# Patient Record
Sex: MALE | Race: WHITE | NOT HISPANIC OR LATINO | Employment: OTHER | URBAN - METROPOLITAN AREA
[De-identification: names, ages, dates, MRNs, and addresses within clinical notes are randomized per-mention and may not be internally consistent; named-entity substitution may affect disease eponyms.]

---

## 2017-11-01 ENCOUNTER — HOSPITAL ENCOUNTER (INPATIENT)
Facility: HOSPITAL | Age: 52
LOS: 1 days | Discharge: HOME | End: 2017-11-03
Attending: EMERGENCY MEDICINE | Admitting: INTERNAL MEDICINE

## 2017-11-01 ENCOUNTER — APPOINTMENT (OUTPATIENT)
Dept: GENERAL RADIOLOGY | Facility: HOSPITAL | Age: 52
End: 2017-11-01

## 2017-11-01 DIAGNOSIS — J18.9 PNEUMONIA OF RIGHT LOWER LOBE DUE TO INFECTIOUS ORGANISM: Primary | ICD-10-CM

## 2017-11-01 DIAGNOSIS — R09.02 HYPOXIA: ICD-10-CM

## 2017-11-01 PROBLEM — H40.9 GLAUCOMA: Status: ACTIVE | Noted: 2017-11-01

## 2017-11-01 PROBLEM — J13 PNEUMONIA DUE TO STREPTOCOCCUS PNEUMONIAE: Status: RESOLVED | Noted: 2017-11-01 | Resolved: 2017-11-01

## 2017-11-01 PROBLEM — J13 PNEUMONIA OF LEFT LOWER LOBE DUE TO STREPTOCOCCUS PNEUMONIAE: Status: ACTIVE | Noted: 2017-11-01

## 2017-11-01 PROBLEM — E78.00 PURE HYPERCHOLESTEROLEMIA: Status: ACTIVE | Noted: 2017-11-01

## 2017-11-01 PROBLEM — G40.909 SEIZURE DISORDER: Status: ACTIVE | Noted: 2017-11-01

## 2017-11-01 PROBLEM — J13 PNEUMONIA DUE TO STREPTOCOCCUS PNEUMONIAE: Status: ACTIVE | Noted: 2017-11-01

## 2017-11-01 PROBLEM — E78.01 FAMILIAL HYPERCHOLESTEROLEMIA: Status: ACTIVE | Noted: 2017-11-01

## 2017-11-01 PROBLEM — N32.81 OAB (OVERACTIVE BLADDER): Status: ACTIVE | Noted: 2017-11-01

## 2017-11-01 PROBLEM — F41.1 GAD (GENERALIZED ANXIETY DISORDER): Status: ACTIVE | Noted: 2017-11-01

## 2017-11-01 LAB
ALBUMIN SERPL-MCNC: 4.2 G/DL (ref 3.2–4.8)
ALBUMIN/GLOB SERPL: 1.4 G/DL (ref 1.5–2.5)
ALP SERPL-CCNC: 83 U/L (ref 25–100)
ALT SERPL W P-5'-P-CCNC: 25 U/L (ref 7–40)
ANION GAP SERPL CALCULATED.3IONS-SCNC: 5 MMOL/L (ref 3–11)
AST SERPL-CCNC: 25 U/L (ref 0–33)
BASOPHILS # BLD AUTO: 0.03 10*3/MM3 (ref 0–0.2)
BASOPHILS NFR BLD AUTO: 0.3 % (ref 0–1)
BILIRUB SERPL-MCNC: 0.5 MG/DL (ref 0.3–1.2)
BNP SERPL-MCNC: <2 PG/ML (ref 0–100)
BUN BLD-MCNC: 6 MG/DL (ref 9–23)
BUN/CREAT SERPL: 12 (ref 7–25)
CALCIUM SPEC-SCNC: 9.1 MG/DL (ref 8.7–10.4)
CHLORIDE SERPL-SCNC: 97 MMOL/L (ref 99–109)
CO2 SERPL-SCNC: 29 MMOL/L (ref 20–31)
CREAT BLD-MCNC: 0.5 MG/DL (ref 0.6–1.3)
D-LACTATE SERPL-SCNC: 1.2 MMOL/L (ref 0.5–2)
DEPRECATED RDW RBC AUTO: 41.8 FL (ref 37–54)
EOSINOPHIL # BLD AUTO: 0.17 10*3/MM3 (ref 0–0.3)
EOSINOPHIL NFR BLD AUTO: 1.9 % (ref 0–3)
ERYTHROCYTE [DISTWIDTH] IN BLOOD BY AUTOMATED COUNT: 13 % (ref 11.3–14.5)
GFR SERPL CREATININE-BSD FRML MDRD: >150 ML/MIN/1.73
GLOBULIN UR ELPH-MCNC: 3.1 GM/DL
GLUCOSE BLD-MCNC: 93 MG/DL (ref 70–100)
HCT VFR BLD AUTO: 46.5 % (ref 38.9–50.9)
HGB BLD-MCNC: 16 G/DL (ref 13.1–17.5)
HOLD SPECIMEN: NORMAL
HOLD SPECIMEN: NORMAL
IMM GRANULOCYTES # BLD: 0.02 10*3/MM3 (ref 0–0.03)
IMM GRANULOCYTES NFR BLD: 0.2 % (ref 0–0.6)
LYMPHOCYTES # BLD AUTO: 2.12 10*3/MM3 (ref 0.6–4.8)
LYMPHOCYTES NFR BLD AUTO: 23.5 % (ref 24–44)
MCH RBC QN AUTO: 30.4 PG (ref 27–31)
MCHC RBC AUTO-ENTMCNC: 34.4 G/DL (ref 32–36)
MCV RBC AUTO: 88.2 FL (ref 80–99)
MONOCYTES # BLD AUTO: 1.07 10*3/MM3 (ref 0–1)
MONOCYTES NFR BLD AUTO: 11.8 % (ref 0–12)
NEUTROPHILS # BLD AUTO: 5.62 10*3/MM3 (ref 1.5–8.3)
NEUTROPHILS NFR BLD AUTO: 62.3 % (ref 41–71)
PLATELET # BLD AUTO: 254 10*3/MM3 (ref 150–450)
PMV BLD AUTO: 10.2 FL (ref 6–12)
POTASSIUM BLD-SCNC: 3.9 MMOL/L (ref 3.5–5.5)
PROCALCITONIN SERPL-MCNC: 0.18 NG/ML
PROT SERPL-MCNC: 7.3 G/DL (ref 5.7–8.2)
RBC # BLD AUTO: 5.27 10*6/MM3 (ref 4.2–5.76)
SODIUM BLD-SCNC: 131 MMOL/L (ref 132–146)
TROPONIN I SERPL-MCNC: 0 NG/ML (ref 0–0.07)
TROPONIN I SERPL-MCNC: 0 NG/ML (ref 0–0.07)
WBC NRBC COR # BLD: 9.03 10*3/MM3 (ref 3.5–10.8)
WHOLE BLOOD HOLD SPECIMEN: NORMAL
WHOLE BLOOD HOLD SPECIMEN: NORMAL

## 2017-11-01 PROCEDURE — A9270 NON-COVERED ITEM OR SERVICE: HCPCS | Performed by: FAMILY MEDICINE

## 2017-11-01 PROCEDURE — 99285 EMERGENCY DEPT VISIT HI MDM: CPT

## 2017-11-01 PROCEDURE — 87449 NOS EACH ORGANISM AG IA: CPT | Performed by: FAMILY MEDICINE

## 2017-11-01 PROCEDURE — 63710000001 BUDESONIDE-FORMOTEROL 160-4.5 MCG/ACT AEROSOL 6 G INHALER: Performed by: FAMILY MEDICINE

## 2017-11-01 PROCEDURE — 84484 ASSAY OF TROPONIN QUANT: CPT

## 2017-11-01 PROCEDURE — 99220 PR INITIAL OBSERVATION CARE/DAY 70 MINUTES: CPT | Performed by: FAMILY MEDICINE

## 2017-11-01 PROCEDURE — 63710000001 CARBAMAZEPINE 200 MG TABLET: Performed by: FAMILY MEDICINE

## 2017-11-01 PROCEDURE — 63710000001 ATORVASTATIN 20 MG TABLET: Performed by: FAMILY MEDICINE

## 2017-11-01 PROCEDURE — 87040 BLOOD CULTURE FOR BACTERIA: CPT | Performed by: EMERGENCY MEDICINE

## 2017-11-01 PROCEDURE — 93005 ELECTROCARDIOGRAM TRACING: CPT

## 2017-11-01 PROCEDURE — 93005 ELECTROCARDIOGRAM TRACING: CPT | Performed by: EMERGENCY MEDICINE

## 2017-11-01 PROCEDURE — 94799 UNLISTED PULMONARY SVC/PX: CPT

## 2017-11-01 PROCEDURE — 63710000001 SENNA 8.6 MG TABLET: Performed by: FAMILY MEDICINE

## 2017-11-01 PROCEDURE — 83880 ASSAY OF NATRIURETIC PEPTIDE: CPT | Performed by: EMERGENCY MEDICINE

## 2017-11-01 PROCEDURE — 84145 PROCALCITONIN (PCT): CPT | Performed by: EMERGENCY MEDICINE

## 2017-11-01 PROCEDURE — 63710000001 DOCUSATE SODIUM 100 MG CAPSULE: Performed by: FAMILY MEDICINE

## 2017-11-01 PROCEDURE — 63710000001 TIMOLOL 0.5 % SOLUTION 5 ML BOTTLE: Performed by: FAMILY MEDICINE

## 2017-11-01 PROCEDURE — 36415 COLL VENOUS BLD VENIPUNCTURE: CPT

## 2017-11-01 PROCEDURE — 63710000001 TRAZODONE 50 MG TABLET: Performed by: FAMILY MEDICINE

## 2017-11-01 PROCEDURE — 25010000002 AZITHROMYCIN: Performed by: EMERGENCY MEDICINE

## 2017-11-01 PROCEDURE — 80053 COMPREHEN METABOLIC PANEL: CPT | Performed by: EMERGENCY MEDICINE

## 2017-11-01 PROCEDURE — 63710000001 ACETAMINOPHEN 325 MG TABLET: Performed by: FAMILY MEDICINE

## 2017-11-01 PROCEDURE — 25010000002 CEFTRIAXONE PER 250 MG: Performed by: EMERGENCY MEDICINE

## 2017-11-01 PROCEDURE — 94760 N-INVAS EAR/PLS OXIMETRY 1: CPT

## 2017-11-01 PROCEDURE — 87899 AGENT NOS ASSAY W/OPTIC: CPT | Performed by: FAMILY MEDICINE

## 2017-11-01 PROCEDURE — 85025 COMPLETE CBC W/AUTO DIFF WBC: CPT | Performed by: EMERGENCY MEDICINE

## 2017-11-01 PROCEDURE — 83605 ASSAY OF LACTIC ACID: CPT | Performed by: EMERGENCY MEDICINE

## 2017-11-01 PROCEDURE — 63710000001 OXYBUTYNIN 5 MG TABLET: Performed by: FAMILY MEDICINE

## 2017-11-01 PROCEDURE — 71010 HC CHEST PA OR AP: CPT

## 2017-11-01 RX ORDER — DOCUSATE SODIUM 100 MG/1
100 CAPSULE, LIQUID FILLED ORAL 2 TIMES DAILY
COMMUNITY

## 2017-11-01 RX ORDER — MULTIVITAMIN WITH FOLIC ACID 400 MCG
1 TABLET ORAL EVERY MORNING
COMMUNITY

## 2017-11-01 RX ORDER — TIMOLOL MALEATE 5 MG/ML
1 SOLUTION/ DROPS OPHTHALMIC EVERY 12 HOURS SCHEDULED
Status: DISCONTINUED | OUTPATIENT
Start: 2017-11-01 | End: 2017-11-03 | Stop reason: HOSPADM

## 2017-11-01 RX ORDER — SODIUM CHLORIDE 0.9 % (FLUSH) 0.9 %
10 SYRINGE (ML) INJECTION AS NEEDED
Status: DISCONTINUED | OUTPATIENT
Start: 2017-11-01 | End: 2017-11-03 | Stop reason: HOSPADM

## 2017-11-01 RX ORDER — CEFTRIAXONE SODIUM 1 G/50ML
1 INJECTION, SOLUTION INTRAVENOUS EVERY 24 HOURS
Status: DISCONTINUED | OUTPATIENT
Start: 2017-11-02 | End: 2017-11-02

## 2017-11-01 RX ORDER — CEFTRIAXONE SODIUM 1 G/50ML
1 INJECTION, SOLUTION INTRAVENOUS ONCE
Status: COMPLETED | OUTPATIENT
Start: 2017-11-01 | End: 2017-11-01

## 2017-11-01 RX ORDER — CARBAMAZEPINE 200 MG/1
200 TABLET ORAL 3 TIMES DAILY
COMMUNITY

## 2017-11-01 RX ORDER — SENNOSIDES 8.6 MG
2 TABLET ORAL 2 TIMES DAILY
COMMUNITY

## 2017-11-01 RX ORDER — SENNOSIDES 8.6 MG
2 TABLET ORAL 2 TIMES DAILY
Status: DISCONTINUED | OUTPATIENT
Start: 2017-11-01 | End: 2017-11-03 | Stop reason: HOSPADM

## 2017-11-01 RX ORDER — DOCUSATE SODIUM 100 MG/1
100 CAPSULE, LIQUID FILLED ORAL 2 TIMES DAILY
Status: DISCONTINUED | OUTPATIENT
Start: 2017-11-01 | End: 2017-11-03 | Stop reason: HOSPADM

## 2017-11-01 RX ORDER — DIPHENOXYLATE HYDROCHLORIDE AND ATROPINE SULFATE 2.5; .025 MG/1; MG/1
1 TABLET ORAL EVERY MORNING
Status: DISCONTINUED | OUTPATIENT
Start: 2017-11-02 | End: 2017-11-03 | Stop reason: HOSPADM

## 2017-11-01 RX ORDER — TRAZODONE HYDROCHLORIDE 50 MG/1
50 TABLET ORAL NIGHTLY
Status: DISCONTINUED | OUTPATIENT
Start: 2017-11-01 | End: 2017-11-03 | Stop reason: HOSPADM

## 2017-11-01 RX ORDER — IPRATROPIUM BROMIDE AND ALBUTEROL SULFATE 2.5; .5 MG/3ML; MG/3ML
3 SOLUTION RESPIRATORY (INHALATION)
Status: DISCONTINUED | OUTPATIENT
Start: 2017-11-01 | End: 2017-11-03 | Stop reason: HOSPADM

## 2017-11-01 RX ORDER — LATANOPROST 50 UG/ML
1 SOLUTION/ DROPS OPHTHALMIC NIGHTLY
Status: DISCONTINUED | OUTPATIENT
Start: 2017-11-02 | End: 2017-11-03 | Stop reason: HOSPADM

## 2017-11-01 RX ORDER — TRAVOPROST OPHTHALMIC SOLUTION 0.04 MG/ML
1 SOLUTION OPHTHALMIC NIGHTLY
COMMUNITY

## 2017-11-01 RX ORDER — ONDANSETRON 2 MG/ML
4 INJECTION INTRAMUSCULAR; INTRAVENOUS EVERY 6 HOURS PRN
Status: DISCONTINUED | OUTPATIENT
Start: 2017-11-01 | End: 2017-11-03 | Stop reason: HOSPADM

## 2017-11-01 RX ORDER — SODIUM CHLORIDE 9 MG/ML
75 INJECTION, SOLUTION INTRAVENOUS CONTINUOUS
Status: DISCONTINUED | OUTPATIENT
Start: 2017-11-01 | End: 2017-11-03 | Stop reason: HOSPADM

## 2017-11-01 RX ORDER — ACETAMINOPHEN 500 MG
1000 TABLET ORAL ONCE
Status: COMPLETED | OUTPATIENT
Start: 2017-11-01 | End: 2017-11-01

## 2017-11-01 RX ORDER — BUDESONIDE AND FORMOTEROL FUMARATE DIHYDRATE 160; 4.5 UG/1; UG/1
2 AEROSOL RESPIRATORY (INHALATION)
Status: DISCONTINUED | OUTPATIENT
Start: 2017-11-01 | End: 2017-11-03 | Stop reason: HOSPADM

## 2017-11-01 RX ORDER — CARBAMAZEPINE 200 MG/1
200 TABLET ORAL 3 TIMES DAILY
Status: DISCONTINUED | OUTPATIENT
Start: 2017-11-01 | End: 2017-11-03 | Stop reason: HOSPADM

## 2017-11-01 RX ORDER — OXYBUTYNIN CHLORIDE 5 MG/1
5 TABLET ORAL 3 TIMES DAILY
Status: DISCONTINUED | OUTPATIENT
Start: 2017-11-01 | End: 2017-11-03 | Stop reason: HOSPADM

## 2017-11-01 RX ORDER — TRAZODONE HYDROCHLORIDE 50 MG/1
50 TABLET ORAL NIGHTLY
COMMUNITY

## 2017-11-01 RX ORDER — HYDROCODONE BITARTRATE AND ACETAMINOPHEN 5; 325 MG/1; MG/1
1 TABLET ORAL EVERY 4 HOURS PRN
Status: DISCONTINUED | OUTPATIENT
Start: 2017-11-01 | End: 2017-11-03 | Stop reason: HOSPADM

## 2017-11-01 RX ORDER — ACETAMINOPHEN 325 MG/1
650 TABLET ORAL 2 TIMES DAILY
Status: DISCONTINUED | OUTPATIENT
Start: 2017-11-01 | End: 2017-11-03 | Stop reason: HOSPADM

## 2017-11-01 RX ORDER — SIMVASTATIN 40 MG
40 TABLET ORAL NIGHTLY
COMMUNITY

## 2017-11-01 RX ORDER — ATORVASTATIN CALCIUM 20 MG/1
20 TABLET, FILM COATED ORAL DAILY
Status: DISCONTINUED | OUTPATIENT
Start: 2017-11-01 | End: 2017-11-03 | Stop reason: HOSPADM

## 2017-11-01 RX ORDER — OXYBUTYNIN CHLORIDE 5 MG/1
5 TABLET ORAL 3 TIMES DAILY
COMMUNITY

## 2017-11-01 RX ORDER — BRIMONIDINE TARTRATE AND TIMOLOL MALEATE 2; 5 MG/ML; MG/ML
1 SOLUTION OPHTHALMIC EVERY 12 HOURS
COMMUNITY

## 2017-11-01 RX ORDER — SODIUM CHLORIDE 0.9 % (FLUSH) 0.9 %
1-10 SYRINGE (ML) INJECTION AS NEEDED
Status: DISCONTINUED | OUTPATIENT
Start: 2017-11-01 | End: 2017-11-03 | Stop reason: HOSPADM

## 2017-11-01 RX ORDER — LORAZEPAM 2 MG/ML
0.5 INJECTION INTRAMUSCULAR EVERY 6 HOURS PRN
Status: DISCONTINUED | OUTPATIENT
Start: 2017-11-01 | End: 2017-11-03 | Stop reason: HOSPADM

## 2017-11-01 RX ORDER — ACETAMINOPHEN 325 MG/1
650 TABLET ORAL 2 TIMES DAILY
COMMUNITY

## 2017-11-01 RX ADMIN — CEFTRIAXONE SODIUM 1 G: 1 INJECTION, SOLUTION INTRAVENOUS at 17:05

## 2017-11-01 RX ADMIN — TIMOLOL MALEATE 1 DROP: 5 SOLUTION/ DROPS OPHTHALMIC at 21:41

## 2017-11-01 RX ADMIN — DOCUSATE SODIUM 100 MG: 100 CAPSULE, LIQUID FILLED ORAL at 19:46

## 2017-11-01 RX ADMIN — TRAZODONE HYDROCHLORIDE 50 MG: 50 TABLET ORAL at 20:39

## 2017-11-01 RX ADMIN — ACETAMINOPHEN 1000 MG: 500 TABLET ORAL at 17:04

## 2017-11-01 RX ADMIN — AZITHROMYCIN 500 MG: 500 INJECTION, POWDER, LYOPHILIZED, FOR SOLUTION INTRAVENOUS at 17:51

## 2017-11-01 RX ADMIN — ACETAMINOPHEN 650 MG: 325 TABLET ORAL at 20:38

## 2017-11-01 RX ADMIN — OXYBUTYNIN CHLORIDE 5 MG: 5 TABLET ORAL at 20:39

## 2017-11-01 RX ADMIN — IPRATROPIUM BROMIDE AND ALBUTEROL SULFATE 3 ML: .5; 3 SOLUTION RESPIRATORY (INHALATION) at 21:46

## 2017-11-01 RX ADMIN — ATORVASTATIN CALCIUM 20 MG: 20 TABLET, FILM COATED ORAL at 20:39

## 2017-11-01 RX ADMIN — CARBAMAZEPINE 200 MG: 200 TABLET ORAL at 21:41

## 2017-11-01 RX ADMIN — SODIUM CHLORIDE 75 ML/HR: 9 INJECTION, SOLUTION INTRAVENOUS at 19:46

## 2017-11-01 RX ADMIN — SENNOSIDES 17.2 MG: 8.6 TABLET, FILM COATED ORAL at 19:46

## 2017-11-01 RX ADMIN — BUDESONIDE AND FORMOTEROL FUMARATE DIHYDRATE 2 PUFF: 160; 4.5 AEROSOL RESPIRATORY (INHALATION) at 21:46
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
## 2017-11-02 ENCOUNTER — APPOINTMENT (OUTPATIENT)
Dept: GENERAL RADIOLOGY | Facility: HOSPITAL | Age: 52
End: 2017-11-02

## 2017-11-02 PROBLEM — J18.9 PNEUMONIA OF RIGHT LOWER LOBE DUE TO INFECTIOUS ORGANISM: Status: ACTIVE | Noted: 2017-11-02

## 2017-11-02 LAB
ANION GAP SERPL CALCULATED.3IONS-SCNC: 4 MMOL/L (ref 3–11)
ARTICHOKE IGE QN: 44 MG/DL (ref 0–130)
BASOPHILS # BLD AUTO: 0.04 10*3/MM3 (ref 0–0.2)
BASOPHILS NFR BLD AUTO: 0.7 % (ref 0–1)
BUN BLD-MCNC: 6 MG/DL (ref 9–23)
BUN/CREAT SERPL: 12 (ref 7–25)
CALCIUM SPEC-SCNC: 8.5 MG/DL (ref 8.7–10.4)
CARBAMAZEPINE SERPL-MCNC: 10 MCG/ML (ref 4–10)
CHLORIDE SERPL-SCNC: 100 MMOL/L (ref 99–109)
CHOLEST SERPL-MCNC: 104 MG/DL (ref 0–200)
CO2 SERPL-SCNC: 29 MMOL/L (ref 20–31)
CREAT BLD-MCNC: 0.5 MG/DL (ref 0.6–1.3)
D DIMER PPP FEU-MCNC: 0.25 MG/L (FEU) (ref 0–0.5)
DEPRECATED RDW RBC AUTO: 42.3 FL (ref 37–54)
EOSINOPHIL # BLD AUTO: 0.22 10*3/MM3 (ref 0–0.3)
EOSINOPHIL NFR BLD AUTO: 3.7 % (ref 0–3)
ERYTHROCYTE [DISTWIDTH] IN BLOOD BY AUTOMATED COUNT: 12.9 % (ref 11.3–14.5)
FLUAV SUBTYP SPEC NAA+PROBE: NOT DETECTED
FLUBV RNA ISLT QL NAA+PROBE: NOT DETECTED
GFR SERPL CREATININE-BSD FRML MDRD: >150 ML/MIN/1.73
GLUCOSE BLD-MCNC: 93 MG/DL (ref 70–100)
HBA1C MFR BLD: 5.7 % (ref 4.8–5.6)
HCT VFR BLD AUTO: 43.3 % (ref 38.9–50.9)
HDLC SERPL-MCNC: 52 MG/DL (ref 40–60)
HGB BLD-MCNC: 14.5 G/DL (ref 13.1–17.5)
IMM GRANULOCYTES # BLD: 0.01 10*3/MM3 (ref 0–0.03)
IMM GRANULOCYTES NFR BLD: 0.2 % (ref 0–0.6)
LYMPHOCYTES # BLD AUTO: 1.59 10*3/MM3 (ref 0.6–4.8)
LYMPHOCYTES NFR BLD AUTO: 27.1 % (ref 24–44)
MCH RBC QN AUTO: 29.9 PG (ref 27–31)
MCHC RBC AUTO-ENTMCNC: 33.5 G/DL (ref 32–36)
MCV RBC AUTO: 89.3 FL (ref 80–99)
MONOCYTES # BLD AUTO: 0.79 10*3/MM3 (ref 0–1)
MONOCYTES NFR BLD AUTO: 13.5 % (ref 0–12)
NEUTROPHILS # BLD AUTO: 3.22 10*3/MM3 (ref 1.5–8.3)
NEUTROPHILS NFR BLD AUTO: 54.8 % (ref 41–71)
PLATELET # BLD AUTO: 222 10*3/MM3 (ref 150–450)
PMV BLD AUTO: 10.2 FL (ref 6–12)
POTASSIUM BLD-SCNC: 4.1 MMOL/L (ref 3.5–5.5)
RBC # BLD AUTO: 4.85 10*6/MM3 (ref 4.2–5.76)
SODIUM BLD-SCNC: 133 MMOL/L (ref 132–146)
TRIGL SERPL-MCNC: 67 MG/DL (ref 0–150)
WBC NRBC COR # BLD: 5.87 10*3/MM3 (ref 3.5–10.8)

## 2017-11-02 PROCEDURE — 87502 INFLUENZA DNA AMP PROBE: CPT | Performed by: INTERNAL MEDICINE

## 2017-11-02 PROCEDURE — 63710000001 MULTIVITAMIN TABLET: Performed by: FAMILY MEDICINE

## 2017-11-02 PROCEDURE — 94760 N-INVAS EAR/PLS OXIMETRY 1: CPT

## 2017-11-02 PROCEDURE — A9270 NON-COVERED ITEM OR SERVICE: HCPCS | Performed by: FAMILY MEDICINE

## 2017-11-02 PROCEDURE — 80156 ASSAY CARBAMAZEPINE TOTAL: CPT | Performed by: FAMILY MEDICINE

## 2017-11-02 PROCEDURE — 63710000001 CARBAMAZEPINE 200 MG TABLET: Performed by: FAMILY MEDICINE

## 2017-11-02 PROCEDURE — 94640 AIRWAY INHALATION TREATMENT: CPT

## 2017-11-02 PROCEDURE — 25010000002 ENOXAPARIN PER 10 MG: Performed by: FAMILY MEDICINE

## 2017-11-02 PROCEDURE — 80061 LIPID PANEL: CPT | Performed by: FAMILY MEDICINE

## 2017-11-02 PROCEDURE — 80048 BASIC METABOLIC PNL TOTAL CA: CPT | Performed by: FAMILY MEDICINE

## 2017-11-02 PROCEDURE — 63710000001 ACETAMINOPHEN 325 MG TABLET: Performed by: FAMILY MEDICINE

## 2017-11-02 PROCEDURE — 85379 FIBRIN DEGRADATION QUANT: CPT | Performed by: INTERNAL MEDICINE

## 2017-11-02 PROCEDURE — 71020 HC CHEST PA AND LATERAL: CPT

## 2017-11-02 PROCEDURE — 99233 SBSQ HOSP IP/OBS HIGH 50: CPT | Performed by: INTERNAL MEDICINE

## 2017-11-02 PROCEDURE — 94799 UNLISTED PULMONARY SVC/PX: CPT

## 2017-11-02 PROCEDURE — 83036 HEMOGLOBIN GLYCOSYLATED A1C: CPT | Performed by: FAMILY MEDICINE

## 2017-11-02 PROCEDURE — 85025 COMPLETE CBC W/AUTO DIFF WBC: CPT | Performed by: FAMILY MEDICINE

## 2017-11-02 PROCEDURE — 63710000001 OXYBUTYNIN 5 MG TABLET: Performed by: FAMILY MEDICINE

## 2017-11-02 PROCEDURE — 87581 M.PNEUMON DNA AMP PROBE: CPT | Performed by: FAMILY MEDICINE

## 2017-11-02 PROCEDURE — 63710000001 DOCUSATE SODIUM 100 MG CAPSULE: Performed by: FAMILY MEDICINE

## 2017-11-02 PROCEDURE — 63710000001 SENNA 8.6 MG TABLET: Performed by: FAMILY MEDICINE

## 2017-11-02 RX ORDER — BENZONATATE 100 MG/1
200 CAPSULE ORAL 3 TIMES DAILY PRN
Status: DISCONTINUED | OUTPATIENT
Start: 2017-11-02 | End: 2017-11-03 | Stop reason: HOSPADM

## 2017-11-02 RX ORDER — AZITHROMYCIN 250 MG/1
250 TABLET, FILM COATED ORAL
Status: DISCONTINUED | OUTPATIENT
Start: 2017-11-02 | End: 2017-11-03 | Stop reason: HOSPADM

## 2017-11-02 RX ADMIN — IPRATROPIUM BROMIDE AND ALBUTEROL SULFATE 3 ML: .5; 3 SOLUTION RESPIRATORY (INHALATION) at 07:14

## 2017-11-02 RX ADMIN — SENNOSIDES 17.2 MG: 8.6 TABLET, FILM COATED ORAL at 17:28

## 2017-11-02 RX ADMIN — SODIUM CHLORIDE 75 ML/HR: 9 INJECTION, SOLUTION INTRAVENOUS at 08:28

## 2017-11-02 RX ADMIN — IPRATROPIUM BROMIDE AND ALBUTEROL SULFATE 3 ML: .5; 3 SOLUTION RESPIRATORY (INHALATION) at 15:52

## 2017-11-02 RX ADMIN — CARBAMAZEPINE 200 MG: 200 TABLET ORAL at 20:22

## 2017-11-02 RX ADMIN — SENNOSIDES 17.2 MG: 8.6 TABLET, FILM COATED ORAL at 08:31

## 2017-11-02 RX ADMIN — CARBAMAZEPINE 200 MG: 200 TABLET ORAL at 13:47

## 2017-11-02 RX ADMIN — CARBAMAZEPINE 200 MG: 200 TABLET ORAL at 05:46

## 2017-11-02 RX ADMIN — TIMOLOL MALEATE 1 DROP: 5 SOLUTION/ DROPS OPHTHALMIC at 20:22

## 2017-11-02 RX ADMIN — LATANOPROST 1 DROP: 50 SOLUTION OPHTHALMIC at 20:22

## 2017-11-02 RX ADMIN — OXYBUTYNIN CHLORIDE 5 MG: 5 TABLET ORAL at 20:22

## 2017-11-02 RX ADMIN — Medication 1 TABLET: at 08:31

## 2017-11-02 RX ADMIN — TIMOLOL MALEATE 1 DROP: 5 SOLUTION/ DROPS OPHTHALMIC at 08:32

## 2017-11-02 RX ADMIN — BUDESONIDE AND FORMOTEROL FUMARATE DIHYDRATE 2 PUFF: 160; 4.5 AEROSOL RESPIRATORY (INHALATION) at 20:20

## 2017-11-02 RX ADMIN — ENOXAPARIN SODIUM 40 MG: 40 INJECTION SUBCUTANEOUS at 05:46

## 2017-11-02 RX ADMIN — BUDESONIDE AND FORMOTEROL FUMARATE DIHYDRATE 2 PUFF: 160; 4.5 AEROSOL RESPIRATORY (INHALATION) at 07:14

## 2017-11-02 RX ADMIN — ACETAMINOPHEN 650 MG: 325 TABLET ORAL at 08:31

## 2017-11-02 RX ADMIN — DOCUSATE SODIUM 100 MG: 100 CAPSULE, LIQUID FILLED ORAL at 17:28

## 2017-11-02 RX ADMIN — IPRATROPIUM BROMIDE AND ALBUTEROL SULFATE 3 ML: .5; 3 SOLUTION RESPIRATORY (INHALATION) at 20:20

## 2017-11-02 RX ADMIN — SODIUM CHLORIDE 75 ML/HR: 9 INJECTION, SOLUTION INTRAVENOUS at 20:24

## 2017-11-02 RX ADMIN — BENZONATATE 200 MG: 100 CAPSULE ORAL at 17:28

## 2017-11-02 RX ADMIN — AZITHROMYCIN 250 MG: 250 TABLET, FILM COATED ORAL at 13:47

## 2017-11-02 RX ADMIN — OXYBUTYNIN CHLORIDE 5 MG: 5 TABLET ORAL at 08:32

## 2017-11-02 RX ADMIN — ACETAMINOPHEN 650 MG: 325 TABLET ORAL at 20:22

## 2017-11-02 RX ADMIN — DOCUSATE SODIUM 100 MG: 100 CAPSULE, LIQUID FILLED ORAL at 08:32

## 2017-11-02 RX ADMIN — IPRATROPIUM BROMIDE AND ALBUTEROL SULFATE 3 ML: .5; 3 SOLUTION RESPIRATORY (INHALATION) at 12:09

## 2017-11-02 RX ADMIN — TRAZODONE HYDROCHLORIDE 50 MG: 50 TABLET ORAL at 20:21

## 2017-11-02 RX ADMIN — ATORVASTATIN CALCIUM 20 MG: 20 TABLET, FILM COATED ORAL at 20:21

## 2017-11-02 RX ADMIN — OXYBUTYNIN CHLORIDE 5 MG: 5 TABLET ORAL at 17:28

## 2017-11-02 NOTE — PLAN OF CARE
Problem: Infection, Risk/Actual (Adult)  Goal: Infection Prevention/Resolution  Outcome: Ongoing (interventions implemented as appropriate)    Problem: Skin Integrity Impairment, Risk/Actual (Adult)  Goal: Skin Integrity/Wound Healing  Outcome: Ongoing (interventions implemented as appropriate)

## 2017-11-02 NOTE — PROGRESS NOTES
[facility] Medicine Services  PROGRESS NOTE    Patient Name: Anderson Rey  : 1965  MRN: [id number]    Date of Admission: 2017  Length of Stay: 0  Primary Care Physician: Yelena Fuentes MD    Subjective   Subjective     CC:  cough    HPI:  Pt feeling well this am, cough is better.     Review of Systems  Gen- No fevers, chills  CV- No chest pain, palpitations  Resp- No cough, dyspnea  GI- No N/V/D, abd pain    Otherwise ROS is negative except as mentioned in the HPI.    Objective   Objective     Vital Signs:   Temp:  [97.3 °F (36.3 °C)-98.1 °F (36.7 °C)] 97.9 °F (36.6 °C)  Heart Rate:  [62-94] 64  Resp:  [16-20] 16  BP: (107-129)/(63-92) 117/69        Physical Exam:  Constitutional: No acute distress, awake, alert  HENT: NCAT, mucous membranes moist  Respiratory: Clear to auscultation bilaterally, respiratory effort normal   Cardiovascular: RRR, no murmurs, rubs, or gallops, palpable pedal pulses bilaterally  Gastrointestinal: Positive bowel sounds, soft, nontender, nondistended  Musculoskeletal: No bilateral ankle edema  Psychiatric: Appropriate affect, cooperative  Neurologic: Oriented x 3,  Cranial Nerves grossly intact to confrontation, speech clear  Skin: No rashes    Results Reviewed:  I have personally reviewed current lab, radiology, and data and agree.      Results from last 7 days  Lab Units 17  0715 17  1205   WBC 10*3/mm3 5.87 9.03   HEMOGLOBIN g/dL 14.5 16.0   HEMATOCRIT % 43.3 46.5   PLATELETS 10*3/mm3 222 254       Results from last 7 days  Lab Units 17  0715 17  1205   SODIUM mmol/L 133 131*   POTASSIUM mmol/L 4.1 3.9   CHLORIDE mmol/L 100 97*   CO2 mmol/L 29.0 29.0   BUN mg/dL 6* 6*   CREATININE mg/dL 0.50* 0.50*   GLUCOSE mg/dL 93 93   CALCIUM mg/dL 8.5* 9.1   ALT (SGPT) U/L  --  25   AST (SGOT) U/L  --  25     BNP   Date Value Ref Range Status   2017 <2.0 0.0 - 100.0 pg/mL Final     No results found for:  PHART    Microbiology Results Abnormal     Procedure Component Value - Date/Time    Blood Culture - Blood, [[id number]]  (Normal) Collected:  11/01/17 1555    Lab Status:  Preliminary result Specimen:  Blood from Arm, Right Updated:  11/02/17 0531     Blood Culture No growth at less than 24 hours    Blood Culture - Blood, [[id number]]  (Normal) Collected:  11/01/17 1555    Lab Status:  Preliminary result Specimen:  Blood from Wrist, Right Updated:  11/02/17 0531     Blood Culture No growth at less than 24 hours          Imaging Results (last 24 hours)     Procedure Component Value Units Date/Time    XR Chest 1 View [[id number]] Collected:  11/01/17 1541     Updated:  11/01/17 2117    Narrative:       EXAMINATION: XR CHEST 1 VW- 11/01/2017     INDICATION: SOA triage protocol      COMPARISON: 09/17/2012     FINDINGS: Heart and vasculature are normal in size. Mild basilar  interstitial changes appear little increased from the prior study, but  this may be due to lighter film technique. No lung consolidation  effusion or pneumothorax is seen.           Impression:       Mildly increased basilar interstitial changes probably due  to portable film technique. No clearly new chest disease is seen  elsewhere.        D:  11/01/2017  E:  11/01/2017     This report was finalized on 11/1/2017 9:14 PM by DR. Garrick Lopez MD.       XR Chest PA & Lateral [[id number]] Collected:  11/02/17 1112     Updated:  11/02/17 1112    Narrative:       EXAMINATION: XR CHEST PA AND LATERAL-      INDICATION: J18.1-Lobar pneumonia, unspecified organism;  R09.02-Hypoxemia.      COMPARISON: 11/01/2017 portable chest.     FINDINGS: Heart and vasculature appear normal in size. Lungs are  moderately well expanded and appear stable, clear except for mild  generalized coarsening of interstitial markings unchanged from the prior  study. No edema, effusion or pneumothorax is seen.           Impression:       Stable chest exam with mild nonspecific interstitial  changes  of the lower lungs. No new or progressive chest disease is identified.     D:  11/02/2017  E:  11/02/2017                I have reviewed the medications.    Assessment/Plan   Assessment / Plan     Hospital Problem List     Hypoxemia    Familial hypercholesterolemia    Seizure disorder    Glaucoma    QUEENIE (generalized anxiety disorder)    OAB (overactive bladder)             Brief Hospital Course to date:  Anderson Rey is a 52 y.o. male with PMH of TBI who lives in a group home and presented with complaints of cough, was found to be hypoxic on admission to the ED and was admitted for possible PNA.  CXR, however, failed to demonstrate any evidence of infiltrate.        Plan:  --D-dimer negative, so doubt underlying PE.  Suspect symptoms are related to bronchitis.  Flu PCR PENDING. CXR with findings as mentioned above (i.e. No evidence of PNA).  Will wean ABX to Azithromycin PO in anticipation of d/c tomorrow.  --continue home meds for seizure disorder, etc.     DVT Prophylaxis:  Lovenox    CODE STATUS: Full Code    Disposition: I expect the patient to be discharged to group home in 1 day.    Mary Ann Herman MD  11/02/17  12:58 PM

## 2017-11-02 NOTE — PLAN OF CARE
Problem: Infection, Risk/Actual (Adult)  Goal: Identify Related Risk Factors and Signs and Symptoms  Outcome: Ongoing (interventions implemented as appropriate)  Goal: Infection Prevention/Resolution  Outcome: Ongoing (interventions implemented as appropriate)    Problem: Patient Care Overview (Adult)  Goal: Plan of Care Review  Outcome: Ongoing (interventions implemented as appropriate)  Goal: Adult Individualization and Mutuality  Outcome: Ongoing (interventions implemented as appropriate)  Goal: Discharge Needs Assessment  Outcome: Ongoing (interventions implemented as appropriate)    Problem: Skin Integrity Impairment, Risk/Actual (Adult)  Goal: Identify Related Risk Factors and Signs and Symptoms  Outcome: Ongoing (interventions implemented as appropriate)  Goal: Skin Integrity/Wound Healing  Outcome: Ongoing (interventions implemented as appropriate)

## 2017-11-02 NOTE — PROGRESS NOTES
Discharge Planning Assessment  [facility]     Patient Name: Anderson Rey  MRN: [id number]  Today's Date: 11/2/2017    Admit Date: 11/1/2017          Discharge Needs Assessment       11/02/17 1159    Living Environment    Lives With facility resident    Living Arrangements group home    Home Accessibility no concerns    Stair Railings at Home none    Type of Financial/Environmental Concern none    Transportation Available van, wheelchair accessible   FT[employer], Group home transportation    Living Environment    Provides Primary Care For no one, unable/limited ability to care for self    Quality Of Family Relationships supportive    Able to Return to Prior Living Arrangements yes    Discharge Needs Assessment    Concerns To Be Addressed no discharge needs identified    Anticipated Changes Related to Illness none    Equipment Currently Used at Home wheelchair    Equipment Needed After Discharge none            Discharge Plan       11/02/17 1203    Case Management/Social Work Plan    Plan Group Home     Patient/Family In Agreement With Plan yes    Additional Comments Spoke with pt and pt's caregiver at bedside. Pt lives in a group home in [place]. Pt's caregiver reports pt lives in [facility] nad the number and people who run it is through [employer] [phone]. Pt reports he will go back to his group home when medically stable and had no further needs or concerns. Pt currently on oxygen in room, however does not have it at home so will monitor to see if still requires oxygen at discharge. CASPER called [facility] and spoke with Judith. Judith reports that pt can come back when medically stable and they will need a discharge summary and hard scripts. Discharge summary to be faxed to [phone]. Judith reports that pt will be transported by Caregiver back to Group home when medically ready.    Final Note    Final Note SW is following        Discharge Placement     No information found        Expected  Discharge Date and Time     Expected Discharge Date Expected Discharge Time    Nov 3, 2017               Demographic Summary       11/02/17 1154    Referral Information    Reason For Consult discharge planning            Functional Status       11/02/17 1157    Functional Status Prior    Ambulation 4-->completely dependent    Transferring 4-->completely dependent    Toileting 4-->completely dependent    Bathing 4-->completely dependent    Dressing 4-->completely dependent    Eating 0-->independent    IADL    Medications completely dependent    Meal Preparation completely dependent    Housekeeping completely dependent    Laundry completely dependent    Shopping assistive equipment and person    Oral Care independent            Psychosocial     None            Abuse/Neglect     None            Legal     None            Substance Abuse     None            Patient Forms     None          Susie Renae MSW

## 2017-11-03 VITALS
WEIGHT: 226.38 LBS | TEMPERATURE: 98.4 F | DIASTOLIC BLOOD PRESSURE: 78 MMHG | OXYGEN SATURATION: 94 % | HEIGHT: 75 IN | SYSTOLIC BLOOD PRESSURE: 123 MMHG | BODY MASS INDEX: 28.15 KG/M2 | HEART RATE: 63 BPM | RESPIRATION RATE: 16 BRPM

## 2017-11-03 PROBLEM — J20.9 ACUTE BRONCHITIS: Status: ACTIVE | Noted: 2017-11-03

## 2017-11-03 LAB
ANION GAP SERPL CALCULATED.3IONS-SCNC: 5 MMOL/L (ref 3–11)
BUN BLD-MCNC: 8 MG/DL (ref 9–23)
BUN/CREAT SERPL: 16 (ref 7–25)
CALCIUM SPEC-SCNC: 8.3 MG/DL (ref 8.7–10.4)
CHLORIDE SERPL-SCNC: 102 MMOL/L (ref 99–109)
CO2 SERPL-SCNC: 26 MMOL/L (ref 20–31)
CREAT BLD-MCNC: 0.5 MG/DL (ref 0.6–1.3)
DEPRECATED RDW RBC AUTO: 42.5 FL (ref 37–54)
ERYTHROCYTE [DISTWIDTH] IN BLOOD BY AUTOMATED COUNT: 12.9 % (ref 11.3–14.5)
GFR SERPL CREATININE-BSD FRML MDRD: >150 ML/MIN/1.73
GLUCOSE BLD-MCNC: 95 MG/DL (ref 70–100)
HCT VFR BLD AUTO: 43.3 % (ref 38.9–50.9)
HGB BLD-MCNC: 14.1 G/DL (ref 13.1–17.5)
MCH RBC QN AUTO: 29.3 PG (ref 27–31)
MCHC RBC AUTO-ENTMCNC: 32.6 G/DL (ref 32–36)
MCV RBC AUTO: 89.8 FL (ref 80–99)
PLATELET # BLD AUTO: 227 10*3/MM3 (ref 150–450)
PMV BLD AUTO: 10.3 FL (ref 6–12)
POTASSIUM BLD-SCNC: 4.2 MMOL/L (ref 3.5–5.5)
RBC # BLD AUTO: 4.82 10*6/MM3 (ref 4.2–5.76)
SODIUM BLD-SCNC: 133 MMOL/L (ref 132–146)
WBC NRBC COR # BLD: 5.34 10*3/MM3 (ref 3.5–10.8)

## 2017-11-03 PROCEDURE — 25010000002 ENOXAPARIN PER 10 MG: Performed by: FAMILY MEDICINE

## 2017-11-03 PROCEDURE — 85027 COMPLETE CBC AUTOMATED: CPT | Performed by: INTERNAL MEDICINE

## 2017-11-03 PROCEDURE — 94760 N-INVAS EAR/PLS OXIMETRY 1: CPT

## 2017-11-03 PROCEDURE — 94799 UNLISTED PULMONARY SVC/PX: CPT

## 2017-11-03 PROCEDURE — 99239 HOSP IP/OBS DSCHRG MGMT >30: CPT | Performed by: PHYSICIAN ASSISTANT

## 2017-11-03 PROCEDURE — 94640 AIRWAY INHALATION TREATMENT: CPT

## 2017-11-03 PROCEDURE — 80048 BASIC METABOLIC PNL TOTAL CA: CPT | Performed by: INTERNAL MEDICINE

## 2017-11-03 RX ORDER — BENZONATATE 200 MG/1
200 CAPSULE ORAL 3 TIMES DAILY PRN
Qty: 21 CAPSULE | Refills: 0 | Status: SHIPPED | OUTPATIENT
Start: 2017-11-03 | End: 2017-11-03 | Stop reason: HOSPADM

## 2017-11-03 RX ORDER — BUDESONIDE AND FORMOTEROL FUMARATE DIHYDRATE 160; 4.5 UG/1; UG/1
2 AEROSOL RESPIRATORY (INHALATION)
Qty: 1 INHALER | Refills: 0 | Status: SHIPPED | OUTPATIENT
Start: 2017-11-03 | End: 2017-11-03 | Stop reason: HOSPADM

## 2017-11-03 RX ORDER — SACCHAROMYCES BOULARDII 250 MG
250 CAPSULE ORAL 2 TIMES DAILY
Qty: 14 CAPSULE | Refills: 0 | Status: SHIPPED | OUTPATIENT
Start: 2017-11-03 | End: 2017-11-03 | Stop reason: HOSPADM

## 2017-11-03 RX ORDER — AZITHROMYCIN 250 MG/1
TABLET, FILM COATED ORAL
Qty: 4 TABLET | Refills: 0 | OUTPATIENT
Start: 2017-11-04 | End: 2023-03-24

## 2017-11-03 RX ORDER — IPRATROPIUM BROMIDE AND ALBUTEROL SULFATE 2.5; .5 MG/3ML; MG/3ML
3 SOLUTION RESPIRATORY (INHALATION) 4 TIMES DAILY PRN
Qty: 360 ML | Refills: 1 | Status: SHIPPED | OUTPATIENT
Start: 2017-11-03 | End: 2017-11-03 | Stop reason: HOSPADM

## 2017-11-03 RX ORDER — GUAIFENESIN 400 MG/1
400 TABLET ORAL
Qty: 42 TABLET | Refills: 0 | Status: SHIPPED | OUTPATIENT
Start: 2017-11-03 | End: 2017-11-10

## 2017-11-03 RX ADMIN — CARBAMAZEPINE 200 MG: 200 TABLET ORAL at 14:07

## 2017-11-03 RX ADMIN — ENOXAPARIN SODIUM 40 MG: 40 INJECTION SUBCUTANEOUS at 06:17

## 2017-11-03 RX ADMIN — SENNOSIDES 17.2 MG: 8.6 TABLET, FILM COATED ORAL at 09:54

## 2017-11-03 RX ADMIN — ACETAMINOPHEN 650 MG: 325 TABLET ORAL at 09:53

## 2017-11-03 RX ADMIN — AZITHROMYCIN 250 MG: 250 TABLET, FILM COATED ORAL at 09:54

## 2017-11-03 RX ADMIN — IPRATROPIUM BROMIDE AND ALBUTEROL SULFATE 3 ML: .5; 3 SOLUTION RESPIRATORY (INHALATION) at 16:39

## 2017-11-03 RX ADMIN — DOCUSATE SODIUM 100 MG: 100 CAPSULE, LIQUID FILLED ORAL at 09:53

## 2017-11-03 RX ADMIN — Medication 1 TABLET: at 09:54

## 2017-11-03 RX ADMIN — TIMOLOL MALEATE 1 DROP: 5 SOLUTION/ DROPS OPHTHALMIC at 09:55

## 2017-11-03 RX ADMIN — SODIUM CHLORIDE 75 ML/HR: 9 INJECTION, SOLUTION INTRAVENOUS at 10:18

## 2017-11-03 RX ADMIN — BUDESONIDE AND FORMOTEROL FUMARATE DIHYDRATE 2 PUFF: 160; 4.5 AEROSOL RESPIRATORY (INHALATION) at 08:45

## 2017-11-03 RX ADMIN — IPRATROPIUM BROMIDE AND ALBUTEROL SULFATE 3 ML: .5; 3 SOLUTION RESPIRATORY (INHALATION) at 13:08

## 2017-11-03 RX ADMIN — IPRATROPIUM BROMIDE AND ALBUTEROL SULFATE 3 ML: .5; 3 SOLUTION RESPIRATORY (INHALATION) at 08:45

## 2017-11-03 RX ADMIN — CARBAMAZEPINE 200 MG: 200 TABLET ORAL at 06:17

## 2017-11-03 RX ADMIN — OXYBUTYNIN CHLORIDE 5 MG: 5 TABLET ORAL at 09:54

## 2017-11-03 NOTE — PLAN OF CARE
Problem: Infection, Risk/Actual (Adult)  Goal: Identify Related Risk Factors and Signs and Symptoms  Outcome: Ongoing (interventions implemented as appropriate)  Goal: Infection Prevention/Resolution  Outcome: Ongoing (interventions implemented as appropriate)    Problem: Patient Care Overview (Adult)  Goal: Plan of Care Review  Outcome: Ongoing (interventions implemented as appropriate)    11/03/17 0322   Coping/Psychosocial Response Interventions   Plan Of Care Reviewed With patient   Patient Care Overview   Progress no change   Outcome Evaluation   Outcome Summary/Follow up Plan rested during the night. no acute issues this shift. denies pain at this time. vital signs stable.        Goal: Adult Individualization and Mutuality  Outcome: Ongoing (interventions implemented as appropriate)  Goal: Discharge Needs Assessment  Outcome: Ongoing (interventions implemented as appropriate)    Problem: Skin Integrity Impairment, Risk/Actual (Adult)  Goal: Identify Related Risk Factors and Signs and Symptoms  Outcome: Ongoing (interventions implemented as appropriate)  Goal: Skin Integrity/Wound Healing  Outcome: Ongoing (interventions implemented as appropriate)

## 2017-11-03 NOTE — DISCHARGE INSTR - APPOINTMENTS
You have an appointment with GILA Lucio on November 14, 2017 @ 10:30 AM.   Call them if you have any questions. Phone: [phone]  [facility] [place] [id number]

## 2017-11-03 NOTE — PROGRESS NOTES
Continued Stay Note  [facility]     Patient Name: Anderson Rey  MRN: [id number]  Today's Date: 11/3/2017    Admit Date: 11/1/2017          Discharge Plan       11/03/17 1244    Case Management/Social Work Plan    Plan [facility]    Patient/Family In Agreement With Plan yes    Additional Comments Spoke with PA who reports pt ready for discahrge today. Will need discahrge summary and hard scripts. Fx for discharge summary is [phone]. CASPER called and psoke with Judith at [facility] and judith is aware pt able to come back to [facility] today. Judith reports pt's caregiver will provide transportation and they will be at [facility] around 1:45-2:00pm.     Final Note    Final Note CASPER is following              Discharge Codes     None        Expected Discharge Date and Time     Expected Discharge Date Expected Discharge Time    Nov 3, 2017             LILLIAN Vieira
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
## 2017-11-04 LAB
BACTERIA FLD CULT: NORMAL
L PNEUMO1 AG UR QL IA: NEGATIVE
Lab: NORMAL
ORGANISM ID: NORMAL
S PNEUM AG SPEC QL LA: NEGATIVE
SPECIMEN SOURCE: NORMAL

## 2017-11-06 LAB
BACTERIA SPEC AEROBE CULT: NORMAL
BACTERIA SPEC AEROBE CULT: NORMAL
M PNEUMO DNA SPEC QL NAA+PROBE: NEGATIVE

## 2023-03-13 ENCOUNTER — HOSPITAL ENCOUNTER (EMERGENCY)
Facility: HOSPITAL | Age: 58
Discharge: HOME | End: 2023-03-13
Attending: EMERGENCY MEDICINE | Admitting: EMERGENCY MEDICINE
Payer: MEDICARE

## 2023-03-13 ENCOUNTER — APPOINTMENT (OUTPATIENT)
Dept: CT IMAGING | Facility: HOSPITAL | Age: 58
End: 2023-03-13
Payer: MEDICARE

## 2023-03-13 VITALS
BODY MASS INDEX: 27.09 KG/M2 | RESPIRATION RATE: 18 BRPM | WEIGHT: 200 LBS | OXYGEN SATURATION: 93 % | DIASTOLIC BLOOD PRESSURE: 81 MMHG | HEART RATE: 61 BPM | SYSTOLIC BLOOD PRESSURE: 123 MMHG | TEMPERATURE: 97.7 F | HEIGHT: 72 IN

## 2023-03-13 DIAGNOSIS — S00.03XA CONTUSION OF SCALP, INITIAL ENCOUNTER: Primary | ICD-10-CM

## 2023-03-13 PROCEDURE — 70450 CT HEAD/BRAIN W/O DYE: CPT

## 2023-03-13 PROCEDURE — 72125 CT NECK SPINE W/O DYE: CPT

## 2023-03-13 PROCEDURE — 99283 EMERGENCY DEPT VISIT LOW MDM: CPT

## 2023-03-13 NOTE — ED PROVIDER NOTES
Patient Education     Hemorrhoids    Hemorrhoids are swollen and inflamed veins inside the rectum and near the anus. The rectum is the last several inches of the colon. The anus is the passage between the rectum and the outside of the body.  Causes  The veins can become swollen due to increased pressure in them. This is most often caused by:  · Chronic constipation or diarrhea  · Straining when having a bowel movement  · Sitting too long on the toilet  · A low-fiber diet  · Pregnancy  Symptoms  · Bleeding from the rectum (this may be noticeable after bowel movements)  · Lump near the anus  · Itching around the anus  · Pain around the anus  There are different types of hemorrhoids. Depending on the type you have and the severity, you may be able to treat yourself at home. In some cases, a procedure may be the best treatment option. Your healthcare provider can tell you more about this, if needed.  Home care  General care  · To get relief from pain or itching, try:  ? Medicines. Your healthcare provider may recommend stool softeners, suppositories, or laxatives to help manage constipation. Use these exactly as directed.  ? Sitz baths. A sitz bath involves sitting in a few inches of warm bath water. Be careful not to make the water so hot that you burn yourself--test it before sitting in it. Soak for about 10 to 15 minutes a few times a day. This may help relieve pain.  ? Topical products. Your healthcare provider may prescribe or recommend creams, ointments, or pads that can be applied to the hemorrhoid. Use these exactly as directed.  Tips to help prevent hemorrhoids  · Eat more fiber. Fiber adds bulk to stool and absorbs water as it moves through your colon. This makes stool softer and easier to pass.  ? Increase the fiber in your diet with more fiber-rich foods. These include fresh fruit, vegetables, and whole grains.  ? Take a fiber supplement or bulking agent, if advised by your healthcare provider. These include  Subjective   History of Present Illness  57-year-old male with known developmental delay who was sent for evaluation after a fall.  The patient lives in a group home.  Reportedly he fell back out of his wheelchair and struck his head.  He denies any acute injuries on initial questioning.  When examining him in detail he does report some mild neck pain and later does report a mild headache.  No reported recent fever, bodies, or chills.  No reported chest pain or abdominal pain.  No pain to the bilateral upper or lower extremities.  He does not take any anticoagulation.  No other acute complaints.        Review of Systems   Constitutional: Negative for chills, fatigue and fever.   HENT: Negative for congestion, ear pain, postnasal drip, sinus pressure and sore throat.    Eyes: Negative for pain, redness and visual disturbance.   Respiratory: Negative for cough, chest tightness and shortness of breath.    Cardiovascular: Negative for chest pain, palpitations and leg swelling.   Gastrointestinal: Negative for abdominal pain, anal bleeding, blood in stool, diarrhea, nausea and vomiting.   Endocrine: Negative for polydipsia and polyuria.   Genitourinary: Negative for difficulty urinating, dysuria, frequency and urgency.   Musculoskeletal: Positive for neck pain. Negative for arthralgias and back pain.   Skin: Negative for pallor and rash.   Allergic/Immunologic: Negative for environmental allergies and immunocompromised state.   Neurological: Positive for headaches. Negative for dizziness and weakness.   Hematological: Negative for adenopathy.   Psychiatric/Behavioral: Negative for confusion, self-injury and suicidal ideas. The patient is not nervous/anxious.    All other systems reviewed and are negative.      Past Medical History:   Diagnosis Date   • Cerebral palsy, hemiplegic (CMS/HCC)    • QUEENIE (generalized anxiety disorder)    • Glaucoma    • HLD (hyperlipidemia)    • OAB (overactive bladder)    • Post-traumatic  brain syndrome 1987   • Seizure disorder (CMS/HCC)    • Vitamin D deficiency        No Known Allergies    Past Surgical History:   Procedure Laterality Date   • EXPLORATORY LAPAROTOMY  1987   • PEG TUBE REMOVAL  1987   • TRACHEOSTOMY CLOSURE/STOMA REVISION  1987       Family History   Problem Relation Age of Onset   • No Known Problems Mother    • No Known Problems Father        Social History     Socioeconomic History   • Marital status: Single     Spouse name: N/A   • Number of children: 0   • Years of education: H.S.   Tobacco Use   • Smoking status: Never   • Smokeless tobacco: Never   Substance and Sexual Activity   • Alcohol use: No   • Drug use: No   • Sexual activity: Never           Objective   Physical Exam  Vitals and nursing note reviewed.   Constitutional:       General: He is not in acute distress.     Appearance: Normal appearance. He is well-developed. He is not toxic-appearing or diaphoretic.   HENT:      Head: Normocephalic and atraumatic.        Right Ear: External ear normal.      Left Ear: External ear normal.      Nose: Nose normal.   Eyes:      General: Lids are normal.      Pupils: Pupils are equal, round, and reactive to light.   Neck:      Trachea: No tracheal deviation.     Cardiovascular:      Rate and Rhythm: Normal rate and regular rhythm.      Pulses: No decreased pulses.      Heart sounds: Normal heart sounds. No murmur heard.    No friction rub. No gallop.   Pulmonary:      Effort: Pulmonary effort is normal. No respiratory distress.      Breath sounds: Normal breath sounds. No decreased breath sounds, wheezing, rhonchi or rales.   Abdominal:      General: Bowel sounds are normal.      Palpations: Abdomen is soft.      Tenderness: There is no abdominal tenderness. There is no guarding or rebound.   Musculoskeletal:         General: No deformity. Normal range of motion.      Cervical back: Normal range of motion and neck supple.   Lymphadenopathy:      Cervical: No cervical  products such as psyllium or methylcellulose.  · Drink more water. Your healthcare provider may direct you to drink plenty of water. This can help keep stool soft.  · Be more active. Frequent exercise aids digestion and helps prevent constipation. It may also help make bowel movements more regular.  · Don’t strain during bowel movements. This can make hemorrhoids more likely. Also, don’t sit on the toilet for long periods of time.  Follow-up care  Follow up with your healthcare provider as advised. If a culture or imaging tests were done, someone will let you know the results when they are ready. This may take a few days or longer. If your healthcare provider recommends a procedure for your hemorrhoids, these options can be discussed. Options may include surgery and outpatient office treatments.  When to seek medical advice  Call your healthcare provider right away if any of these occur:  · Increased bleeding from the rectum  · Increased pain around the rectum or anus  · Weakness or dizziness  Call 911  Call 911 if any of these occur:  · Trouble breathing or swallowing  · Fainting or loss of consciousness  · Unusually fast heart rate  · Vomiting blood  · Large amounts of blood in stool or black, tarry stools  Date Last Reviewed: 9/1/2017  © 3669-9459 [employer]. [address] [place] [address], [place] [address]. All rights reserved. This information is not intended as a substitute for professional medical care. Always follow your healthcare professional's instructions.            adenopathy.   Skin:     General: Skin is warm and dry.      Findings: No rash.   Neurological:      Mental Status: He is alert and oriented to person, place, and time.      GCS: GCS eye subscore is 4. GCS verbal subscore is 4. GCS motor subscore is 6.      Cranial Nerves: No cranial nerve deficit.      Sensory: No sensory deficit.   Psychiatric:         Speech: Speech normal.         Behavior: Behavior normal.         Thought Content: Thought content normal.         Judgment: Judgment normal.         Procedures           ED Course                                           Medical Decision Making  Differential diagnosis includes scalp contusion, scalp laceration, intracranial injury, cervical strain, cervical fracture.    CT scan of the head without contrast and CT scan of the cervical spine showed no acute abnormality.  Degenerative changes are reported on CT scan of the cervical spine.    Vital signs remain normal.    The patient appears consistent with baseline.    The patient was discharged back to his facility.  Advised to take Tylenol or ibuprofen as needed for pain.    Contusion of scalp, initial encounter: acute illness or injury  Amount and/or Complexity of Data Reviewed  Independent Historian: EMS  External Data Reviewed: radiology.  Radiology: ordered.          Final diagnoses:   Contusion of scalp, initial encounter       ED Disposition  ED Disposition     ED Disposition   Discharge    Condition   Stable    Comment   --             Yelena Fuentes MD  [address] [place] [id number]  [phone]    In 1 week           Medication List      No changes were made to your prescriptions during this visit.          Trevin Moses MD  03/13/23 9215

## 2023-03-24 ENCOUNTER — HOSPITAL ENCOUNTER (INPATIENT)
Facility: HOSPITAL | Age: 58
LOS: 5 days | Discharge: HOME | DRG: 194 | End: 2023-03-29
Attending: EMERGENCY MEDICINE | Admitting: FAMILY MEDICINE
Payer: MEDICARE

## 2023-03-24 ENCOUNTER — APPOINTMENT (OUTPATIENT)
Dept: CT IMAGING | Facility: HOSPITAL | Age: 58
DRG: 194 | End: 2023-03-24
Payer: MEDICARE

## 2023-03-24 ENCOUNTER — APPOINTMENT (OUTPATIENT)
Dept: GENERAL RADIOLOGY | Facility: HOSPITAL | Age: 58
DRG: 194 | End: 2023-03-24
Payer: MEDICARE

## 2023-03-24 DIAGNOSIS — J18.9 PNEUMONIA OF LEFT LOWER LOBE DUE TO INFECTIOUS ORGANISM: Primary | ICD-10-CM

## 2023-03-24 DIAGNOSIS — A41.9 SEPSIS, DUE TO UNSPECIFIED ORGANISM, UNSPECIFIED WHETHER ACUTE ORGAN DYSFUNCTION PRESENT: ICD-10-CM

## 2023-03-24 DIAGNOSIS — Z86.69 HISTORY OF CEREBRAL PALSY: ICD-10-CM

## 2023-03-24 DIAGNOSIS — R13.10 DYSPHAGIA, UNSPECIFIED TYPE: ICD-10-CM

## 2023-03-24 DIAGNOSIS — N39.0 URINARY TRACT INFECTION WITHOUT HEMATURIA, SITE UNSPECIFIED: ICD-10-CM

## 2023-03-24 PROBLEM — E87.1 HYPONATREMIA: Status: ACTIVE | Noted: 2023-03-24

## 2023-03-24 PROBLEM — S06.9XAA TBI (TRAUMATIC BRAIN INJURY): Status: ACTIVE | Noted: 2023-03-24

## 2023-03-24 LAB
ALBUMIN SERPL-MCNC: 3.9 G/DL (ref 3.5–5.2)
ALBUMIN/GLOB SERPL: 1.1 G/DL
ALP SERPL-CCNC: 71 U/L (ref 39–117)
ALT SERPL W P-5'-P-CCNC: 20 U/L (ref 1–41)
ANION GAP SERPL CALCULATED.3IONS-SCNC: 11 MMOL/L (ref 5–15)
AST SERPL-CCNC: 20 U/L (ref 1–40)
BACTERIA UR QL AUTO: ABNORMAL /HPF
BASOPHILS # BLD AUTO: 0.03 10*3/MM3 (ref 0–0.2)
BASOPHILS NFR BLD AUTO: 0.3 % (ref 0–1.5)
BILIRUB SERPL-MCNC: 0.6 MG/DL (ref 0–1.2)
BILIRUB UR QL STRIP: ABNORMAL
BUN SERPL-MCNC: 12 MG/DL (ref 6–20)
BUN/CREAT SERPL: 21.1 (ref 7–25)
CALCIUM SPEC-SCNC: 9.1 MG/DL (ref 8.6–10.5)
CARBAMAZEPINE SERPL-MCNC: 11.7 MCG/ML (ref 4–12)
CHLORIDE SERPL-SCNC: 94 MMOL/L (ref 98–107)
CLARITY UR: ABNORMAL
CO2 SERPL-SCNC: 25 MMOL/L (ref 22–29)
COLOR UR: ABNORMAL
CREAT SERPL-MCNC: 0.57 MG/DL (ref 0.76–1.27)
D-LACTATE SERPL-SCNC: 1.2 MMOL/L (ref 0.5–2)
DEPRECATED RDW RBC AUTO: 39.4 FL (ref 37–54)
EGFRCR SERPLBLD CKD-EPI 2021: 115.8 ML/MIN/1.73
EOSINOPHIL # BLD AUTO: 0 10*3/MM3 (ref 0–0.4)
EOSINOPHIL NFR BLD AUTO: 0 % (ref 0.3–6.2)
ERYTHROCYTE [DISTWIDTH] IN BLOOD BY AUTOMATED COUNT: 12.3 % (ref 12.3–15.4)
FLUAV SUBTYP SPEC NAA+PROBE: NOT DETECTED
FLUBV RNA ISLT QL NAA+PROBE: NOT DETECTED
GLOBULIN UR ELPH-MCNC: 3.4 GM/DL
GLUCOSE SERPL-MCNC: 124 MG/DL (ref 65–99)
GLUCOSE UR STRIP-MCNC: NEGATIVE MG/DL
GRAN CASTS URNS QL MICRO: ABNORMAL /LPF
HCT VFR BLD AUTO: 44.4 % (ref 37.5–51)
HGB BLD-MCNC: 15.2 G/DL (ref 13–17.7)
HGB UR QL STRIP.AUTO: NEGATIVE
HOLD SPECIMEN: NORMAL
HYALINE CASTS UR QL AUTO: ABNORMAL /LPF
IMM GRANULOCYTES # BLD AUTO: 0.03 10*3/MM3 (ref 0–0.05)
IMM GRANULOCYTES NFR BLD AUTO: 0.3 % (ref 0–0.5)
KETONES UR QL STRIP: ABNORMAL
LEUKOCYTE ESTERASE UR QL STRIP.AUTO: ABNORMAL
LYMPHOCYTES # BLD AUTO: 0.67 10*3/MM3 (ref 0.7–3.1)
LYMPHOCYTES NFR BLD AUTO: 6.2 % (ref 19.6–45.3)
MCH RBC QN AUTO: 30 PG (ref 26.6–33)
MCHC RBC AUTO-ENTMCNC: 34.2 G/DL (ref 31.5–35.7)
MCV RBC AUTO: 87.7 FL (ref 79–97)
MONOCYTES # BLD AUTO: 0.84 10*3/MM3 (ref 0.1–0.9)
MONOCYTES NFR BLD AUTO: 7.7 % (ref 5–12)
MUCOUS THREADS URNS QL MICRO: ABNORMAL /HPF
NEUTROPHILS NFR BLD AUTO: 85.5 % (ref 42.7–76)
NEUTROPHILS NFR BLD AUTO: 9.27 10*3/MM3 (ref 1.7–7)
NITRITE UR QL STRIP: POSITIVE
NRBC BLD AUTO-RTO: 0 /100 WBC (ref 0–0.2)
NT-PROBNP SERPL-MCNC: 36.7 PG/ML (ref 0–900)
PH UR STRIP.AUTO: 5.5 [PH] (ref 5–8)
PLATELET # BLD AUTO: 211 10*3/MM3 (ref 140–450)
PMV BLD AUTO: 9.8 FL (ref 6–12)
POTASSIUM SERPL-SCNC: 3.7 MMOL/L (ref 3.5–5.2)
PROCALCITONIN SERPL-MCNC: 2.09 NG/ML (ref 0–0.25)
PROT SERPL-MCNC: 7.3 G/DL (ref 6–8.5)
PROT UR QL STRIP: ABNORMAL
RBC # BLD AUTO: 5.06 10*6/MM3 (ref 4.14–5.8)
RBC # UR STRIP: ABNORMAL /HPF
REF LAB TEST METHOD: ABNORMAL
SARS-COV-2 RNA RESP QL NAA+PROBE: NOT DETECTED
SODIUM SERPL-SCNC: 130 MMOL/L (ref 136–145)
SP GR UR STRIP: 1.04 (ref 1–1.03)
SQUAMOUS #/AREA URNS HPF: ABNORMAL /HPF
TROPONIN T SERPL HS-MCNC: 9 NG/L
UROBILINOGEN UR QL STRIP: ABNORMAL
WBC # UR STRIP: ABNORMAL /HPF
WBC NRBC COR # BLD: 10.84 10*3/MM3 (ref 3.4–10.8)
WHOLE BLOOD HOLD COAG: NORMAL
WHOLE BLOOD HOLD SPECIMEN: NORMAL

## 2023-03-24 PROCEDURE — 25510000001 IOPAMIDOL PER 1 ML: Performed by: EMERGENCY MEDICINE

## 2023-03-24 PROCEDURE — 71045 X-RAY EXAM CHEST 1 VIEW: CPT

## 2023-03-24 PROCEDURE — 93005 ELECTROCARDIOGRAM TRACING: CPT | Performed by: EMERGENCY MEDICINE

## 2023-03-24 PROCEDURE — 99285 EMERGENCY DEPT VISIT HI MDM: CPT

## 2023-03-24 PROCEDURE — 87086 URINE CULTURE/COLONY COUNT: CPT | Performed by: PHYSICIAN ASSISTANT

## 2023-03-24 PROCEDURE — 87077 CULTURE AEROBIC IDENTIFY: CPT | Performed by: PHYSICIAN ASSISTANT

## 2023-03-24 PROCEDURE — 84145 PROCALCITONIN (PCT): CPT | Performed by: PHYSICIAN ASSISTANT

## 2023-03-24 PROCEDURE — 80156 ASSAY CARBAMAZEPINE TOTAL: CPT | Performed by: PHYSICIAN ASSISTANT

## 2023-03-24 PROCEDURE — 83605 ASSAY OF LACTIC ACID: CPT | Performed by: PHYSICIAN ASSISTANT

## 2023-03-24 PROCEDURE — 84484 ASSAY OF TROPONIN QUANT: CPT | Performed by: EMERGENCY MEDICINE

## 2023-03-24 PROCEDURE — 80053 COMPREHEN METABOLIC PANEL: CPT | Performed by: EMERGENCY MEDICINE

## 2023-03-24 PROCEDURE — 99223 1ST HOSP IP/OBS HIGH 75: CPT

## 2023-03-24 PROCEDURE — 85025 COMPLETE CBC W/AUTO DIFF WBC: CPT | Performed by: EMERGENCY MEDICINE

## 2023-03-24 PROCEDURE — 87636 SARSCOV2 & INF A&B AMP PRB: CPT | Performed by: EMERGENCY MEDICINE

## 2023-03-24 PROCEDURE — 71275 CT ANGIOGRAPHY CHEST: CPT

## 2023-03-24 PROCEDURE — 25010000002 CEFTRIAXONE PER 250 MG: Performed by: PHYSICIAN ASSISTANT

## 2023-03-24 PROCEDURE — 81001 URINALYSIS AUTO W/SCOPE: CPT | Performed by: PHYSICIAN ASSISTANT

## 2023-03-24 PROCEDURE — P9612 CATHETERIZE FOR URINE SPEC: HCPCS

## 2023-03-24 PROCEDURE — 83880 ASSAY OF NATRIURETIC PEPTIDE: CPT | Performed by: EMERGENCY MEDICINE

## 2023-03-24 PROCEDURE — 87040 BLOOD CULTURE FOR BACTERIA: CPT | Performed by: PHYSICIAN ASSISTANT

## 2023-03-24 PROCEDURE — 36415 COLL VENOUS BLD VENIPUNCTURE: CPT

## 2023-03-24 RX ORDER — CARBAMAZEPINE 200 MG/1
200 TABLET ORAL 3 TIMES DAILY
Status: DISCONTINUED | OUTPATIENT
Start: 2023-03-25 | End: 2023-03-29 | Stop reason: HOSPADM

## 2023-03-24 RX ORDER — OXYBUTYNIN CHLORIDE 5 MG/1
5 TABLET ORAL 3 TIMES DAILY
Status: DISCONTINUED | OUTPATIENT
Start: 2023-03-25 | End: 2023-03-29 | Stop reason: HOSPADM

## 2023-03-24 RX ORDER — ACETAMINOPHEN 160 MG/5ML
650 SOLUTION ORAL EVERY 4 HOURS PRN
Status: DISCONTINUED | OUTPATIENT
Start: 2023-03-24 | End: 2023-03-29 | Stop reason: HOSPADM

## 2023-03-24 RX ORDER — SODIUM CHLORIDE 9 MG/ML
75 INJECTION, SOLUTION INTRAVENOUS CONTINUOUS
Status: ACTIVE | OUTPATIENT
Start: 2023-03-25 | End: 2023-03-25

## 2023-03-24 RX ORDER — IPRATROPIUM BROMIDE AND ALBUTEROL SULFATE 2.5; .5 MG/3ML; MG/3ML
3 SOLUTION RESPIRATORY (INHALATION)
Status: DISCONTINUED | OUTPATIENT
Start: 2023-03-25 | End: 2023-03-29 | Stop reason: HOSPADM

## 2023-03-24 RX ORDER — ONDANSETRON 2 MG/ML
4 INJECTION INTRAMUSCULAR; INTRAVENOUS EVERY 6 HOURS PRN
Status: DISCONTINUED | OUTPATIENT
Start: 2023-03-24 | End: 2023-03-29 | Stop reason: HOSPADM

## 2023-03-24 RX ORDER — TIMOLOL MALEATE 5 MG/ML
1 SOLUTION/ DROPS OPHTHALMIC DAILY
Status: DISCONTINUED | OUTPATIENT
Start: 2023-03-25 | End: 2023-03-29 | Stop reason: HOSPADM

## 2023-03-24 RX ORDER — SODIUM CHLORIDE 0.9 % (FLUSH) 0.9 %
10 SYRINGE (ML) INJECTION EVERY 12 HOURS SCHEDULED
Status: DISCONTINUED | OUTPATIENT
Start: 2023-03-25 | End: 2023-03-29 | Stop reason: HOSPADM

## 2023-03-24 RX ORDER — ACETAMINOPHEN 650 MG/1
650 SUPPOSITORY RECTAL EVERY 4 HOURS PRN
Status: DISCONTINUED | OUTPATIENT
Start: 2023-03-24 | End: 2023-03-29 | Stop reason: HOSPADM

## 2023-03-24 RX ORDER — MULTIPLE VITAMINS W/ MINERALS TAB 9MG-400MCG
1 TAB ORAL EVERY MORNING
Status: DISCONTINUED | OUTPATIENT
Start: 2023-03-25 | End: 2023-03-29 | Stop reason: HOSPADM

## 2023-03-24 RX ORDER — SODIUM CHLORIDE 0.9 % (FLUSH) 0.9 %
10 SYRINGE (ML) INJECTION AS NEEDED
Status: DISCONTINUED | OUTPATIENT
Start: 2023-03-24 | End: 2023-03-29 | Stop reason: HOSPADM

## 2023-03-24 RX ORDER — ONDANSETRON 4 MG/1
4 TABLET, FILM COATED ORAL EVERY 6 HOURS PRN
Status: DISCONTINUED | OUTPATIENT
Start: 2023-03-24 | End: 2023-03-29 | Stop reason: HOSPADM

## 2023-03-24 RX ORDER — ATORVASTATIN CALCIUM 20 MG/1
20 TABLET, FILM COATED ORAL NIGHTLY
Status: DISCONTINUED | OUTPATIENT
Start: 2023-03-24 | End: 2023-03-29 | Stop reason: HOSPADM

## 2023-03-24 RX ORDER — BRIMONIDINE TARTRATE 2 MG/ML
1 SOLUTION/ DROPS OPHTHALMIC 2 TIMES DAILY
Status: DISCONTINUED | OUTPATIENT
Start: 2023-03-25 | End: 2023-03-29 | Stop reason: HOSPADM

## 2023-03-24 RX ORDER — SODIUM CHLORIDE 9 MG/ML
40 INJECTION, SOLUTION INTRAVENOUS AS NEEDED
Status: DISCONTINUED | OUTPATIENT
Start: 2023-03-24 | End: 2023-03-29 | Stop reason: HOSPADM

## 2023-03-24 RX ORDER — LATANOPROST 50 UG/ML
1 SOLUTION/ DROPS OPHTHALMIC NIGHTLY
Status: DISCONTINUED | OUTPATIENT
Start: 2023-03-25 | End: 2023-03-29 | Stop reason: HOSPADM

## 2023-03-24 RX ORDER — ACETAMINOPHEN 325 MG/1
650 TABLET ORAL EVERY 4 HOURS PRN
Status: DISCONTINUED | OUTPATIENT
Start: 2023-03-24 | End: 2023-03-29 | Stop reason: HOSPADM

## 2023-03-24 RX ORDER — BENZONATATE 100 MG/1
100 CAPSULE ORAL 3 TIMES DAILY PRN
Status: DISCONTINUED | OUTPATIENT
Start: 2023-03-24 | End: 2023-03-29 | Stop reason: HOSPADM

## 2023-03-24 RX ORDER — TRAZODONE HYDROCHLORIDE 50 MG/1
50 TABLET ORAL NIGHTLY
Status: DISCONTINUED | OUTPATIENT
Start: 2023-03-25 | End: 2023-03-29 | Stop reason: HOSPADM

## 2023-03-24 RX ADMIN — SODIUM CHLORIDE 1 G: 900 INJECTION INTRAVENOUS at 21:09

## 2023-03-24 RX ADMIN — IOPAMIDOL 80 ML: 755 INJECTION, SOLUTION INTRAVENOUS at 19:49

## 2023-03-24 RX ADMIN — TRAZODONE HYDROCHLORIDE 50 MG: 50 TABLET ORAL at 23:32

## 2023-03-24 RX ADMIN — BENZONATATE 100 MG: 100 CAPSULE ORAL at 23:32

## 2023-03-24 RX ADMIN — SODIUM CHLORIDE 1000 ML: 9 INJECTION, SOLUTION INTRAVENOUS at 18:55

## 2023-03-24 RX ADMIN — DOXYCYCLINE 100 MG: 100 INJECTION, POWDER, LYOPHILIZED, FOR SOLUTION INTRAVENOUS at 21:56

## 2023-03-24 RX ADMIN — ATORVASTATIN CALCIUM 20 MG: 20 TABLET, FILM COATED ORAL at 23:32

## 2023-03-24 RX ADMIN — OXYBUTYNIN CHLORIDE 5 MG: 5 TABLET ORAL at 23:32

## 2023-03-24 RX ADMIN — Medication 10 ML: at 23:32

## 2023-03-24 NOTE — Clinical Note
Level of Care: Telemetry [5]   Diagnosis: Pneumonia of left lower lobe due to infectious organism [[id number]]   Certification: I Certify That Inpatient Hospital Services Are Medically Necessary For Greater Than 2 Midnights

## 2023-03-24 NOTE — ED PROVIDER NOTES
Subjective   History of Present Illness  Pt is a 58 yo male presenting to ED by EMS with complaints of SOB, cough and fever. PMHx significant for TBI, Cerebral palsy, hemiplegic, Glaucoma, Seizures and HLD. Pt lives at [facility] and majority of hx provided by caregiver with patient in ED. She reports he developed SOB and cough this morning. He was seen at [facility] and started on Tessalon. Caregiver reports he spiked a fever of 101.7 this afternoon and he began having worsening SOB. Patient given Tylenol at 1700. No reports of confusion, CP, V/D or abdominal pain. No reports of recent antibiotics or admissions. He is vaccinated for Covid and Flu. He does not wear oxygen and O2 on RA 90%. Patient is wheelchair bound.     History provided by:  Medical records, patient and caregiver  History limited by: TBI.      Review of Systems   Constitutional: Positive for chills and fever.   HENT: Negative for congestion.    Respiratory: Positive for cough and shortness of breath.    Cardiovascular: Negative for chest pain.   Gastrointestinal: Negative for abdominal pain, diarrhea and vomiting.   Neurological: Negative for dizziness and headaches.   Psychiatric/Behavioral: Negative for confusion.       Past Medical History:   Diagnosis Date   • Cerebral palsy, hemiplegic (HCC)    • QUEENIE (generalized anxiety disorder)    • Glaucoma    • HLD (hyperlipidemia)    • OAB (overactive bladder)    • Post-traumatic brain syndrome 1987   • Seizure disorder (HCC)    • Vitamin D deficiency        No Known Allergies    Past Surgical History:   Procedure Laterality Date   • EXPLORATORY LAPAROTOMY  1987   • PEG TUBE REMOVAL  1987   • TRACHEOSTOMY CLOSURE/STOMA REVISION  1987       Family History   Problem Relation Age of Onset   • No Known Problems Mother    • No Known Problems Father        Social History     Socioeconomic History   • Marital status: Single     Spouse name: N/A   • Number of children: 0   • Years of education: H.S.    Tobacco Use   • Smoking status: Never   • Smokeless tobacco: Never   Substance and Sexual Activity   • Alcohol use: No   • Drug use: No   • Sexual activity: Never           Objective   Physical Exam  Constitutional:       General: He is not in acute distress.  HENT:      Head: Atraumatic.   Eyes:      Extraocular Movements: Extraocular movements intact.      Pupils: Pupils are equal, round, and reactive to light.   Cardiovascular:      Rate and Rhythm: Tachycardia present.   Pulmonary:      Effort: Tachypnea present. No accessory muscle usage or respiratory distress.      Breath sounds: Decreased breath sounds and rhonchi present.   Abdominal:      Palpations: Abdomen is soft.   Musculoskeletal:      Cervical back: Normal range of motion.      Right lower leg: No edema.      Left lower leg: No edema.   Skin:     General: Skin is warm.   Neurological:      Mental Status: He is alert.   Psychiatric:         Mood and Affect: Mood normal.         Procedures           ED Course  ED Course as of 03/24/23 2146   Fri Mar 24, 2023   1834 Heart Rate(!): 142 [RT]   1834 SpO2: 92 %  RA [RT]   1834 Temp: 99.6 °F (37.6 °C) [RT]   1855 WBC(!): 10.84 [RT]   1942 Procalcitonin(!): 2.09 [RT]   1944 SpO2: 90 %  RA [RT]   1944 Heart Rate: 117 [RT]   2043 Nitrite, UA(!): Positive [RT]   2043 WBC, UA(!): 21-30 [RT]   2043 Bacteria, UA(!): 4+ [RT]      ED Course User Index  [RT] Ioana Yin PA            Recent Results (from the past 24 hour(s))   Covid-19 + Flu A&B AG, Veritor [id number]    Collection Time: 03/24/23 11:41 AM    Specimen: Swab   Result Value Ref Range    SARS Antigen Not Detected Not Detected, Presumptive Negative    Influenza A Antigen RADHA Not Detected Not Detected    Influenza B Antigen RADHA Not Detected Not Detected    Internal Control Passed Passed    Lot Number [id number]     Expiration Date 03/16/2024    ECG 12 Lead ED Triage Standing Order; SOA    Collection Time: 03/24/23  6:07 PM   Result Value Ref Range    QT  Interval 304 ms    QTC Interval 441 ms   Comprehensive Metabolic Panel    Collection Time: 03/24/23  6:41 PM    Specimen: Blood   Result Value Ref Range    Glucose 124 (H) 65 - 99 mg/dL    BUN 12 6 - 20 mg/dL    Creatinine 0.57 (L) 0.76 - 1.27 mg/dL    Sodium 130 (L) 136 - 145 mmol/L    Potassium 3.7 3.5 - 5.2 mmol/L    Chloride 94 (L) 98 - 107 mmol/L    CO2 25.0 22.0 - 29.0 mmol/L    Calcium 9.1 8.6 - 10.5 mg/dL    Total Protein 7.3 6.0 - 8.5 g/dL    Albumin 3.9 3.5 - 5.2 g/dL    ALT (SGPT) 20 1 - 41 U/L    AST (SGOT) 20 1 - 40 U/L    Alkaline Phosphatase 71 39 - 117 U/L    Total Bilirubin 0.6 0.0 - 1.2 mg/dL    Globulin 3.4 gm/dL    A/G Ratio 1.1 g/dL    BUN/Creatinine Ratio 21.1 7.0 - 25.0    Anion Gap 11.0 5.0 - 15.0 mmol/L    eGFR 115.8 >60.0 mL/min/1.73   BNP    Collection Time: 03/24/23  6:41 PM    Specimen: Blood   Result Value Ref Range    proBNP 36.7 0.0 - 900.0 pg/mL   Single High Sensitivity Troponin T    Collection Time: 03/24/23  6:41 PM    Specimen: Blood   Result Value Ref Range    HS Troponin T 9 <15 ng/L   Green Top (Gel)    Collection Time: 03/24/23  6:41 PM   Result Value Ref Range    Extra Tube Hold for add-ons.    Lavender Top    Collection Time: 03/24/23  6:41 PM   Result Value Ref Range    Extra Tube hold for add-on    Gold Top - SST    Collection Time: 03/24/23  6:41 PM   Result Value Ref Range    Extra Tube Hold for add-ons.    Light Blue Top    Collection Time: 03/24/23  6:41 PM   Result Value Ref Range    Extra Tube Hold for add-ons.    CBC Auto Differential    Collection Time: 03/24/23  6:41 PM    Specimen: Blood   Result Value Ref Range    WBC 10.84 (H) 3.40 - 10.80 10*3/mm3    RBC 5.06 4.14 - 5.80 10*6/mm3    Hemoglobin 15.2 13.0 - 17.7 g/dL    Hematocrit 44.4 37.5 - 51.0 %    MCV 87.7 79.0 - 97.0 fL    MCH 30.0 26.6 - 33.0 pg    MCHC 34.2 31.5 - 35.7 g/dL    RDW 12.3 12.3 - 15.4 %    RDW-SD 39.4 37.0 - 54.0 fl    MPV 9.8 6.0 - 12.0 fL    Platelets 211 140 - 450 10*3/mm3    Neutrophil  % 85.5 (H) 42.7 - 76.0 %    Lymphocyte % 6.2 (L) 19.6 - 45.3 %    Monocyte % 7.7 5.0 - 12.0 %    Eosinophil % 0.0 (L) 0.3 - 6.2 %    Basophil % 0.3 0.0 - 1.5 %    Immature Grans % 0.3 0.0 - 0.5 %    Neutrophils, Absolute 9.27 (H) 1.70 - 7.00 10*3/mm3    Lymphocytes, Absolute 0.67 (L) 0.70 - 3.10 10*3/mm3    Monocytes, Absolute 0.84 0.10 - 0.90 10*3/mm3    Eosinophils, Absolute 0.00 0.00 - 0.40 10*3/mm3    Basophils, Absolute 0.03 0.00 - 0.20 10*3/mm3    Immature Grans, Absolute 0.03 0.00 - 0.05 10*3/mm3    nRBC 0.0 0.0 - 0.2 /100 WBC   Lactic Acid, Plasma    Collection Time: 03/24/23  6:41 PM    Specimen: Blood   Result Value Ref Range    Lactate 1.2 0.5 - 2.0 mmol/L   Procalcitonin    Collection Time: 03/24/23  6:41 PM    Specimen: Blood   Result Value Ref Range    Procalcitonin 2.09 (H) 0.00 - 0.25 ng/mL   Carbamazepine Level, Total    Collection Time: 03/24/23  6:41 PM    Specimen: Blood   Result Value Ref Range    Carbamazepine Level 11.7 4.0 - 12.0 mcg/mL   COVID-19 and FLU A/B PCR - Swab, Nasopharynx    Collection Time: 03/24/23  6:53 PM    Specimen: Nasopharynx; Swab   Result Value Ref Range    COVID19 Not Detected Not Detected - Ref. Range    Influenza A PCR Not Detected Not Detected    Influenza B PCR Not Detected Not Detected   Urinalysis With Culture If Indicated - Urine, Catheter    Collection Time: 03/24/23  7:27 PM    Specimen: Urine, Catheter   Result Value Ref Range    Color, UA Dark Yellow (A) Yellow, Straw    Appearance, UA Cloudy (A) Clear    pH, UA 5.5 5.0 - 8.0    Specific Gravity, UA 1.041 (H) 1.001 - 1.030    Glucose, UA Negative Negative    Ketones, UA 80 mg/dL (3+) (A) Negative    Bilirubin, UA Small (1+) (A) Negative    Blood, UA Negative Negative    Protein,  mg/dL (2+) (A) Negative    Leuk Esterase, UA Trace (A) Negative    Nitrite, UA Positive (A) Negative    Urobilinogen, UA 1.0 E.U./dL 0.2 - 1.0 E.U./dL   Urinalysis, Microscopic Only - Urine, Catheter    Collection Time: 03/24/23  " 7:27 PM    Specimen: Urine, Catheter   Result Value Ref Range    RBC, UA 0-2 None Seen, 0-2 /HPF    WBC, UA 21-30 (A) None Seen, 0-2 /HPF    Bacteria, UA 4+ (A) None Seen, Trace /HPF    Squamous Epithelial Cells, UA 0-2 None Seen, 0-2 /HPF    Hyaline Casts, UA 0-6 0 - 6 /LPF    Granular Casts, UA 0-2 None Seen /LPF    Mucus, UA Moderate/2+ (A) None Seen, Trace /HPF    Methodology Manual Light Microscopy      Note: In addition to lab results from this visit, the labs listed above may include labs taken at another facility or during a different encounter within the last 24 hours. Please correlate lab times with ED admission and discharge times for further clarification of the services performed during this visit.    CT Angiogram Chest   Final Result   Impression:   No pulmonary embolus.      Pneumonia in the inferior lingula and left lower lobe.      Electronically Signed: Albert Nava     3/24/2023 8:07 PM EDT     Workstation ID: [id number]      XR Chest 1 View   Final Result   Impression:   1.Lucency in the more peripheral aspect left hemithorax that may relate to a bleb and has developed since prior study.      Electronically Signed: Maury Douglas     3/24/2023 6:21 PM EDT     Workstation ID: [id number]        Vitals:    03/24/23 1801 03/24/23 1900 03/24/23 2000 03/24/23 2100   BP: 117/76 117/67 117/72 126/73   BP Location: Right arm      Patient Position: Sitting      Pulse: (!) 142 117 108 112   Resp: 22      Temp: 99.6 °F (37.6 °C)      TempSrc: Oral      SpO2: 92% 90% 92% 90%   Weight: 90.7 kg (200 lb)      Height: 182.9 cm (72\")        Medications   sodium chloride 0.9 % flush 10 mL (has no administration in time range)   doxycycline (VIBRAMYCIN) 100 mg in sodium chloride 0.9 % 250 mL IVPB-VTB (has no administration in time range)   sodium chloride 0.9 % bolus 1,000 mL (0 mL Intravenous Stopped 3/24/23 2109)   iopamidol (ISOVUE-370) 76 % injection 100 mL (80 mL Intravenous Given 3/24/23 1949)   cefTRIAXone " (ROCEPHIN) 1 g/100 mL 0.9% NS (MBP) (1 g Intravenous New Bag 3/24/23 2109)     ECG/EMG Results (last 24 hours)     Procedure Component Value Units Date/Time    ECG 12 Lead ED Triage Standing Order; SOA [[id number]] Collected: 03/24/23 1807     Updated: 03/24/23 1807     QT Interval 304 ms      QTC Interval 441 ms     Narrative:      Test Reason : ED Triage Standing Order~  Blood Pressure :   */*   mmHG  Vent. Rate : 127 BPM     Atrial Rate : 127 BPM     P-R Int : 140 ms          QRS Dur :  92 ms      QT Int : 304 ms       P-R-T Axes :  36   9  38 degrees     QTc Int : 441 ms    ** Poor data quality, interpretation may be adversely affected  Sinus tachycardia  Possible Left atrial enlargement  Borderline ECG  When compared with ECG of 01-NOV-2017 14:48,  Vent. rate has increased BY  42 BPM    Referred By: EDMD           Confirmed By:         ECG 12 Lead ED Triage Standing Order; SOA   Preliminary Result   Test Reason : ED Triage Standing Order~   Blood Pressure :   */*   mmHG   Vent. Rate : 127 BPM     Atrial Rate : 127 BPM      P-R Int : 140 ms          QRS Dur :  92 ms       QT Int : 304 ms       P-R-T Axes :  36   9  38 degrees      QTc Int : 441 ms      ** Poor data quality, interpretation may be adversely affected   Sinus tachycardia   Possible Left atrial enlargement   Borderline ECG   When compared with ECG of 01-NOV-2017 14:48,   Vent. rate has increased BY  42 BPM      Referred By: EDMD           Confirmed By:                                             Medical Decision Making  Pt is a 56 yo male presenting to ED with complaints of cough, SOB and fever. CXR and CTA noted LLL pneumonia. Patient does not wear oxygen and O2 on RA 90% at rest. Procal 2.09, WBC 10.9, Na 130 and UA consistent with UTI. After discussed with Dr. Juárez started patient on Rocephin and Doxycycline. Patient given fluids. Discussed results with patient and caregiver updating on plan for admission.     Discussed patient with Dr. Juárez  who is agreeable with ED course.     Discussed admission with hospitalist Dr. Yost.     DDx  Pneumonia, PE, ACS, Sepsis, Covid, Flu, UTI    History of cerebral palsy: chronic illness or injury  Pneumonia of left lower lobe due to infectious organism: acute illness or injury  Sepsis, due to unspecified organism, unspecified whether acute organ dysfunction present (HCC): acute illness or injury  Urinary tract infection without hematuria, site unspecified: acute illness or injury  Amount and/or Complexity of Data Reviewed  Independent Historian: caregiver  External Data Reviewed: notes.     Details: Admission  Labs: ordered. Decision-making details documented in ED Course.  Radiology: ordered. Decision-making details documented in ED Course.  ECG/medicine tests: ordered. Decision-making details documented in ED Course.      Risk  Prescription drug management.  Decision regarding hospitalization.          Final diagnoses:   Pneumonia of left lower lobe due to infectious organism   History of cerebral palsy   Sepsis, due to unspecified organism, unspecified whether acute organ dysfunction present (HCC)   Urinary tract infection without hematuria, site unspecified       ED Disposition  ED Disposition     ED Disposition   Decision to Admit    Condition   --    Comment   Level of Care: Telemetry [5]   Diagnosis: Pneumonia of left lower lobe due to infectious organism [[id number]]               No follow-up provider specified.       Medication List      No changes were made to your prescriptions during this visit.          Ioana Yin PA  03/24/23 4154

## 2023-03-25 LAB
ANION GAP SERPL CALCULATED.3IONS-SCNC: 7 MMOL/L (ref 5–15)
BASOPHILS # BLD AUTO: 0.03 10*3/MM3 (ref 0–0.2)
BASOPHILS NFR BLD AUTO: 0.3 % (ref 0–1.5)
BUN SERPL-MCNC: 10 MG/DL (ref 6–20)
BUN/CREAT SERPL: 18.9 (ref 7–25)
CALCIUM SPEC-SCNC: 8 MG/DL (ref 8.6–10.5)
CHLORIDE SERPL-SCNC: 96 MMOL/L (ref 98–107)
CO2 SERPL-SCNC: 27 MMOL/L (ref 22–29)
CREAT SERPL-MCNC: 0.53 MG/DL (ref 0.76–1.27)
DEPRECATED RDW RBC AUTO: 41.2 FL (ref 37–54)
EGFRCR SERPLBLD CKD-EPI 2021: 118.4 ML/MIN/1.73
EOSINOPHIL # BLD AUTO: 0.05 10*3/MM3 (ref 0–0.4)
EOSINOPHIL NFR BLD AUTO: 0.5 % (ref 0.3–6.2)
ERYTHROCYTE [DISTWIDTH] IN BLOOD BY AUTOMATED COUNT: 12.7 % (ref 12.3–15.4)
GLUCOSE SERPL-MCNC: 95 MG/DL (ref 65–99)
HCT VFR BLD AUTO: 38 % (ref 37.5–51)
HGB BLD-MCNC: 12.9 G/DL (ref 13–17.7)
IMM GRANULOCYTES # BLD AUTO: 0.05 10*3/MM3 (ref 0–0.05)
IMM GRANULOCYTES NFR BLD AUTO: 0.5 % (ref 0–0.5)
LYMPHOCYTES # BLD AUTO: 1.86 10*3/MM3 (ref 0.7–3.1)
LYMPHOCYTES NFR BLD AUTO: 18.1 % (ref 19.6–45.3)
MAGNESIUM SERPL-MCNC: 1.7 MG/DL (ref 1.6–2.6)
MCH RBC QN AUTO: 30.2 PG (ref 26.6–33)
MCHC RBC AUTO-ENTMCNC: 33.9 G/DL (ref 31.5–35.7)
MCV RBC AUTO: 89 FL (ref 79–97)
MONOCYTES # BLD AUTO: 1.17 10*3/MM3 (ref 0.1–0.9)
MONOCYTES NFR BLD AUTO: 11.4 % (ref 5–12)
NEUTROPHILS NFR BLD AUTO: 69.2 % (ref 42.7–76)
NEUTROPHILS NFR BLD AUTO: 7.1 10*3/MM3 (ref 1.7–7)
NRBC BLD AUTO-RTO: 0 /100 WBC (ref 0–0.2)
PLATELET # BLD AUTO: 191 10*3/MM3 (ref 140–450)
PMV BLD AUTO: 10 FL (ref 6–12)
POTASSIUM SERPL-SCNC: 3.8 MMOL/L (ref 3.5–5.2)
RBC # BLD AUTO: 4.27 10*6/MM3 (ref 4.14–5.8)
SODIUM SERPL-SCNC: 130 MMOL/L (ref 136–145)
WBC NRBC COR # BLD: 10.26 10*3/MM3 (ref 3.4–10.8)

## 2023-03-25 PROCEDURE — 80048 BASIC METABOLIC PNL TOTAL CA: CPT

## 2023-03-25 PROCEDURE — 94664 DEMO&/EVAL PT USE INHALER: CPT

## 2023-03-25 PROCEDURE — 94799 UNLISTED PULMONARY SVC/PX: CPT

## 2023-03-25 PROCEDURE — 85025 COMPLETE CBC W/AUTO DIFF WBC: CPT

## 2023-03-25 PROCEDURE — 83735 ASSAY OF MAGNESIUM: CPT

## 2023-03-25 PROCEDURE — 99232 SBSQ HOSP IP/OBS MODERATE 35: CPT | Performed by: INTERNAL MEDICINE

## 2023-03-25 PROCEDURE — 94640 AIRWAY INHALATION TREATMENT: CPT

## 2023-03-25 PROCEDURE — 94761 N-INVAS EAR/PLS OXIMETRY MLT: CPT

## 2023-03-25 PROCEDURE — 25010000002 CEFTRIAXONE PER 250 MG

## 2023-03-25 RX ADMIN — LATANOPROST 1 DROP: 50 SOLUTION OPHTHALMIC at 01:06

## 2023-03-25 RX ADMIN — CARBAMAZEPINE 200 MG: 200 TABLET ORAL at 18:03

## 2023-03-25 RX ADMIN — DOXYCYCLINE 100 MG: 100 INJECTION, POWDER, LYOPHILIZED, FOR SOLUTION INTRAVENOUS at 22:28

## 2023-03-25 RX ADMIN — DOXYCYCLINE 100 MG: 100 INJECTION, POWDER, LYOPHILIZED, FOR SOLUTION INTRAVENOUS at 10:46

## 2023-03-25 RX ADMIN — OXYBUTYNIN CHLORIDE 5 MG: 5 TABLET ORAL at 10:47

## 2023-03-25 RX ADMIN — CARBAMAZEPINE 200 MG: 200 TABLET ORAL at 20:46

## 2023-03-25 RX ADMIN — BRIMONIDINE TARTRATE 1 DROP: 2 SOLUTION OPHTHALMIC at 10:46

## 2023-03-25 RX ADMIN — SODIUM CHLORIDE 1 G: 900 INJECTION INTRAVENOUS at 20:46

## 2023-03-25 RX ADMIN — OXYBUTYNIN CHLORIDE 5 MG: 5 TABLET ORAL at 17:25

## 2023-03-25 RX ADMIN — IPRATROPIUM BROMIDE AND ALBUTEROL SULFATE 3 ML: 2.5; .5 SOLUTION RESPIRATORY (INHALATION) at 17:09

## 2023-03-25 RX ADMIN — IPRATROPIUM BROMIDE AND ALBUTEROL SULFATE 3 ML: 2.5; .5 SOLUTION RESPIRATORY (INHALATION) at 19:33

## 2023-03-25 RX ADMIN — Medication 10 ML: at 20:46

## 2023-03-25 RX ADMIN — MULTIPLE VITAMINS W/ MINERALS TAB 1 TABLET: TAB at 10:47

## 2023-03-25 RX ADMIN — IPRATROPIUM BROMIDE AND ALBUTEROL SULFATE 3 ML: 2.5; .5 SOLUTION RESPIRATORY (INHALATION) at 14:18

## 2023-03-25 RX ADMIN — CARBAMAZEPINE 200 MG: 200 TABLET ORAL at 10:47

## 2023-03-25 RX ADMIN — TIMOLOL MALEATE 1 DROP: 5 SOLUTION/ DROPS OPHTHALMIC at 10:47

## 2023-03-25 RX ADMIN — CARBAMAZEPINE 200 MG: 200 TABLET ORAL at 01:04

## 2023-03-25 RX ADMIN — ATORVASTATIN CALCIUM 20 MG: 20 TABLET, FILM COATED ORAL at 20:46

## 2023-03-25 RX ADMIN — BRIMONIDINE TARTRATE 1 DROP: 2 SOLUTION OPHTHALMIC at 01:05

## 2023-03-25 RX ADMIN — OXYBUTYNIN CHLORIDE 5 MG: 5 TABLET ORAL at 20:46

## 2023-03-25 RX ADMIN — BRIMONIDINE TARTRATE 1 DROP: 2 SOLUTION OPHTHALMIC at 20:45

## 2023-03-25 RX ADMIN — IPRATROPIUM BROMIDE AND ALBUTEROL SULFATE 3 ML: 2.5; .5 SOLUTION RESPIRATORY (INHALATION) at 08:31

## 2023-03-25 RX ADMIN — LATANOPROST 1 DROP: 50 SOLUTION OPHTHALMIC at 20:46

## 2023-03-25 RX ADMIN — SODIUM CHLORIDE 75 ML/HR: 9 INJECTION, SOLUTION INTRAVENOUS at 01:35

## 2023-03-25 RX ADMIN — Medication 10 ML: at 10:48

## 2023-03-25 RX ADMIN — TRAZODONE HYDROCHLORIDE 50 MG: 50 TABLET ORAL at 20:46

## 2023-03-25 NOTE — H&P
[facility]  HISTORY AND PHYSICAL    Patient Name: Anderson Rey  : 1965  MRN: [id number]  Primary Care Physician: Yelena Fuentes MD  Date of admission: 3/24/2023    Subjective   Subjective     Chief Complaint:  Cough    HPI:  Anderson Rey is a 57 y.o. male with PMH of TBI with mental/physical disabilities, HLD, seizure disorder, overactive bladder and glaucoma presents to the ED for shortness of breath and cough.  He developed a cough this morning, but has not been able to cough anything up.  He was only satting 90% on room air.  His caregiver took him to to urgent care earlier today where they told him he had a viral respiratory infection.  They told him to use over-the-counter medications for symptoms.  However, patient developed a 101.7 fever.  All he is wanted to do with sleep.  He did not eat dinner due to no appetite.  Denies chest pain, abdominal pain, N/VD, dysuria.        Review of Systems   Constitutional: Positive for appetite change, fatigue and fever.   HENT: Negative for congestion, sore throat and trouble swallowing.    Eyes: Negative.    Respiratory: Positive for cough and shortness of breath. Negative for wheezing.    Cardiovascular: Negative for chest pain and leg swelling.   Gastrointestinal: Negative for abdominal distention, abdominal pain, diarrhea, nausea and vomiting.   Endocrine: Negative.    Genitourinary: Negative for decreased urine volume, difficulty urinating, dysuria, frequency and hematuria.   Musculoskeletal: Negative.    Skin: Negative.    Neurological: Negative for dizziness, weakness and headaches.   Hematological: Negative.    Psychiatric/Behavioral: Negative.  Negative for agitation and confusion.          Personal History     Past Medical History:   Diagnosis Date   • Cerebral palsy, hemiplegic (HCC)    • QUEENIE (generalized anxiety disorder)    • Glaucoma    • HLD (hyperlipidemia)    • OAB (overactive bladder)    •  Post-traumatic brain syndrome 1987   • Seizure disorder (HCC)    • Vitamin D deficiency              Past Surgical History:   Procedure Laterality Date   • EXPLORATORY LAPAROTOMY  1987   • PEG TUBE REMOVAL  1987   • TRACHEOSTOMY CLOSURE/STOMA REVISION  1987       Family History:  family history includes No Known Problems in his father and mother.     Social History:  reports that he has never smoked. He has never used smokeless tobacco. He reports that he does not drink alcohol and does not use drugs.  Social History     Social History Narrative   • Not on file       Medications:  Cholecalciferol, Fluticasone Furoate-Vilanterol, acetaminophen, benzonatate, brimonidine-timolol, carBAMazepine, carbamide peroxide, docusate sodium, multivitamin with minerals, oxybutynin, senna, simvastatin, traZODone, and travoprost (SHIV free)    No Known Allergies    Objective   Objective     Vital Signs:   Temp:  [98.4 °F (36.9 °C)-99.6 °F (37.6 °C)] 99.6 °F (37.6 °C)  Heart Rate:  [] 108  Resp:  [14-22] 22  BP: (117-141)/(67-89) 117/72    Physical Exam  Constitutional:       General: He is not in acute distress.     Appearance: He is obese.   HENT:      Head: Normocephalic.      Nose: Nose normal. No congestion.      Mouth/Throat:      Mouth: Mucous membranes are moist.   Eyes:      Extraocular Movements: Extraocular movements intact.      Pupils: Pupils are equal, round, and reactive to light.   Cardiovascular:      Rate and Rhythm: Regular rhythm. Tachycardia present.      Pulses: Normal pulses.      Heart sounds: Normal heart sounds. No murmur heard.  Pulmonary:      Effort: Pulmonary effort is normal. No respiratory distress.      Breath sounds: Normal breath sounds. No wheezing or rhonchi.   Abdominal:      General: Bowel sounds are normal. There is no distension.      Palpations: Abdomen is soft.      Tenderness: There is no abdominal tenderness.   Musculoskeletal:         General: No swelling. Normal range of motion.       Cervical back: Normal range of motion. No rigidity.   Skin:     General: Skin is warm.      Capillary Refill: Capillary refill takes less than 2 seconds.   Neurological:      General: No focal deficit present.      Mental Status: He is alert. Mental status is at baseline.      Motor: No weakness.   Psychiatric:         Mood and Affect: Mood normal.         Behavior: Behavior normal.         Cognition and Memory: Cognition is impaired.          Result Review:  I have personally reviewed the results from the time of this admission to 3/24/2023 20:50 EDT and agree with these findings:  [x]  Laboratory list / accordion  [x]  Microbiology  [x]  Radiology  []  EKG/Telemetry   []  Cardiology/Vascular   []  Pathology  [x]  Old records  []  Other:  Most notable findings include:     LAB RESULTS:      Lab 03/24/23  1841   WBC 10.84*   HEMOGLOBIN 15.2   HEMATOCRIT 44.4   PLATELETS 211   NEUTROS ABS 9.27*   IMMATURE GRANS (ABS) 0.03   LYMPHS ABS 0.67*   MONOS ABS 0.84   EOS ABS 0.00   MCV 87.7   PROCALCITONIN 2.09*   LACTATE 1.2         Lab 03/24/23  1841   SODIUM 130*   POTASSIUM 3.7   CHLORIDE 94*   CO2 25.0   ANION GAP 11.0   BUN 12   CREATININE 0.57*   EGFR 115.8   GLUCOSE 124*   CALCIUM 9.1         Lab 03/24/23 1841   TOTAL PROTEIN 7.3   ALBUMIN 3.9   GLOBULIN 3.4   ALT (SGPT) 20   AST (SGOT) 20   BILIRUBIN 0.6   ALK PHOS 71         Lab 03/24/23  1841   PROBNP 36.7   HSTROP T 9                 Brief Urine Lab Results  (Last result in the past 365 days)      Color   Clarity   Blood   Leuk Est   Nitrite   Protein   CREAT   Urine HCG        03/24/23 1927 Dark Yellow   Cloudy   Negative   Trace   Positive   100 mg/dL (2+)               Microbiology Results (last 10 days)     Procedure Component Value - Date/Time    COVID PRE-OP / PRE-PROCEDURE SCREENING ORDER (NO ISOLATION) - Swab, Nasopharynx [[id number]]  (Normal) Collected: 03/24/23 1853    Lab Status: Final result Specimen: Swab from Nasopharynx Updated: 03/24/23 1920     Narrative:      The following orders were created for panel order COVID PRE-OP / PRE-PROCEDURE SCREENING ORDER (NO ISOLATION) - Swab, Nasopharynx.  Procedure                               Abnormality         Status                     ---------                               -----------         ------                     COVID-19 and FLU A/B PCR...[[id number]]  Normal              Final result                 Please view results for these tests on the individual orders.    COVID-19 and FLU A/B PCR - Swab, Nasopharynx [[id number]]  (Normal) Collected: 03/24/23 1853    Lab Status: Final result Specimen: Swab from Nasopharynx Updated: 03/24/23 1920     COVID19 Not Detected     Influenza A PCR Not Detected     Influenza B PCR Not Detected    Narrative:      Fact sheet for providers: https://www.fda.gov/media/379228/download    Fact sheet for patients: https://www.fda.gov/media/715613/download    Test performed by PCR.          XR Chest 1 View    Result Date: 3/24/2023  XR CHEST 1 VW Date of Exam: 3/24/2023 6:07 PM EDT Indication: SOA triage protocol. Comparison: November 2, 2017 Findings: The heart is not definitely enlarged. There is slight prominence of markings centrally that could relate to pulmonary vessels accentuated secondary to technique. There is a peripheral lucency in the left hemithorax that may relate to a bleb which has developed since prior study. There are no large pleural effusions     Impression: Impression: 1.Lucency in the more peripheral aspect left hemithorax that may relate to a bleb and has developed since prior study. Electronically Signed: Maury Douglas  3/24/2023 6:21 PM EDT  Workstation ID: [id number]    CT Angiogram Chest    Result Date: 3/24/2023  CT ANGIOGRAM CHEST Date of Exam: 3/24/2023 7:38 PM EDT Indication: SOB, sepsis, r/o pneumonia/ PE. Comparison: None available. Technique: CTA of the chest was performed after the uneventful intravenous administration of 80 mL Isovue-370. Reconstructed  coronal and sagittal images were also obtained. In addition, a 3-D volume rendered image was created for interpretation. Automated exposure control and iterative reconstruction methods were used. Findings: No definite pulmonary embolism. Normal caliber of the main pulmonary artery. Unremarkable appearance of the cardiac chambers. No pericardial effusion or coronary artery calcifications. Two-vessel aortic arch, normal variant, with otherwise unremarkable appearance of the thoracic aorta. Homogeneous attenuation of the thyroid gland. There are shotty mediastinal and left hilar lymph nodes which are simply reactive. Unremarkable appearance of the thoracic esophagus. Chest wall soft tissues are normal. No axillary adenopathy. Unremarkable appearance of the partially imaged upper abdomen. No acute or suspicious bony findings. There are trace dependent secretions in the upper trachea; the trachea and mainstem bronchi are grossly patent. There is bronchial wall thickening in the left lower lobe. There are extensive confluent groundglass and subsolid densities throughout the majority of the left lower lobe and inferior lingula, compatible with infection. There is some subpleural groundglass densities in the dependent right lower lobe, favor atelectasis over infection. Otherwise the lungs are grossly clear. No pleural effusion. No pneumothorax.     Impression: Impression: No pulmonary embolus. Pneumonia in the inferior lingula and left lower lobe. Electronically Signed: Albert Nava  3/24/2023 8:07 PM EDT  Workstation ID: [id number]          Assessment & Plan   Assessment & Plan       Pneumonia of left lower lobe due to infectious organism    Hypoxemia    Familial hypercholesterolemia    QUEENIE (generalized anxiety disorder)    Seizure disorder ([facility])    OAB (overactive bladder)    Acute UTI (urinary tract infection)    Hyponatremia    TBI (traumatic brain injury) ([facility])    Anderson Rey is a 57 y.o. male with PMH of TBI with  mental/physical disabilities, HLD, seizure disorder, overactive bladder and glaucoma presents to the ED for shortness of breath and cough.    Sepsis-tachycardia, elevated WBC and procal  LLL Pneumonia  -CTA chest shows pneumonia in the inferior lingula and left lower lobe  -Rocephin and Doxy  -Pending respiratory culture  -DuoNebs  -Tessalon Perles    UTI  -Pending urine culture  -Rocephin as above    HLD  -Continue home statin    Hyponatremia  -Sodium 130 on arrival  -Maintenance fluids  -Check in a.m.    Glaucoma  -Continue home eyedrops    TBI  Seizure Disorder  -Continue home Tegretol  -Patient is his own guardian      DVT prophylaxis: SCDs    CODE STATUS: Full       Expected Discharge  3/26/2023      This note has been completed as part of a split-shared workflow.     Signature: Electronically signed by TIANNA Fabian, 03/24/23, 9:10 PM EDT.    Total APC Time: 60 minutes      Attending   Admission Attestation       I have performed an independent face-to-face diagnostic evaluation including performing an independent physical examination as documented here.  The documented plan of care above was reviewed and developed with the advanced practice clinician (APC).      Brief Summary Statement:   Anderson Rey is a 57 y.o. male w/ PMH of TBI here with SOA and cough. Per caregiver they first noted cough this am and at that time was satting 90% on RA. Initially went to [facility] where he was diagnosed with viral illness however later developed fever to 101.7 and was brought to ED. He was found to have sepsis due to LLL PNA and UTI. Will start broad spectrum IV abx and await cultures.    Remainder of detailed HPI is as noted by APC and has been reviewed and/or edited by me for completeness.    Attending Physical Exam:  Temp:  [98.4 °F (36.9 °C)-99.6 °F (37.6 °C)] 99.6 °F (37.6 °C)  Heart Rate:  [] 102  Resp:  [14-22] 22  BP: (117-141)/(67-89) 123/79    Constitutional: Awake, alert, resting comfortably  Eyes: PERRLA  sclerae anicteric, no conjunctival injection  HENT: NCAT, mucous membranes moist  Neck: Supple, no thyromegaly, no lymphadenopathy, trachea midline  Respiratory: Clear to auscultation bilaterally, nonlabored respirations   Cardiovascular: RRR, no murmurs, rubs, or gallops, palpable pedal pulses bilaterally  Gastrointestinal: Positive bowel sounds, soft, nontender, nondistended  Musculoskeletal: No bilateral ankle edema, no clubbing or cyanosis to extremities  Psychiatric: Pleasant  Neurologic: Interactive, answers simple questions appropriately, strength symmetric in all extremities, Cranial Nerves grossly intact to confrontation, speech clear  Skin: No rashes      Celi Yost II, DO  03/24/23

## 2023-03-25 NOTE — PROGRESS NOTES
[facility]  PROGRESS NOTE    Patient Name: Anderson Rey  : 1965  MRN: [id number]    Date of Admission: 3/24/2023  Primary Care Physician: Yelena Fuentes MD    Subjective   Subjective     CC:  F/u pna    HPI:  Admitted last night for PNA. SpO2 low 90's on room air. Says he feels about the same this morning, maybe slightly improved. No other acute complaints    ROS:  Gen- No fevers, chills  CV- No chest pain, palpitations  Resp- Yes cough, no dyspnea  GI- No N/V/D, abd pain     Objective   Objective     Vital Signs:   Temp:  [97.9 °F (36.6 °C)-99.6 °F (37.6 °C)] 97.9 °F (36.6 °C)  Heart Rate:  [] 105  Resp:  [14-22] 18  BP: (117-141)/(67-89) 125/77     Physical Exam:  Constitutional: Awake, alert, NAD, laying in bed, slightly diaphoretic  HENT: NCAT, mucous membranes moist  Respiratory: Clear to auscultation bilaterally, respiratory effort normal   Cardiovascular: RRR, palpable radial pulses  Gastrointestinal: Positive bowel sounds, soft, nontender, nondistended  Musculoskeletal: No bilateral ankle edema  Psychiatric: Appropriate affect, cooperative  Neurologic: Contractures of UE's, speech fluent    Results Reviewed:  LAB RESULTS:      Lab 23  0402 23  1841   WBC 10.26 10.84*   HEMOGLOBIN 12.9* 15.2   HEMATOCRIT 38.0 44.4   PLATELETS 191 211   NEUTROS ABS 7.10* 9.27*   IMMATURE GRANS (ABS) 0.05 0.03   LYMPHS ABS 1.86 0.67*   MONOS ABS 1.17* 0.84   EOS ABS 0.05 0.00   MCV 89.0 87.7   PROCALCITONIN  --  2.09*   LACTATE  --  1.2         Lab 23  0402 23  1841   SODIUM 130* 130*   POTASSIUM 3.8 3.7   CHLORIDE 96* 94*   CO2 27.0 25.0   ANION GAP 7.0 11.0   BUN 10 12   CREATININE 0.53* 0.57*   EGFR 118.4 115.8   GLUCOSE 95 124*   CALCIUM 8.0* 9.1   MAGNESIUM 1.7  --          Lab 23  184   TOTAL PROTEIN 7.3   ALBUMIN 3.9   GLOBULIN 3.4   ALT (SGPT) 20   AST (SGOT) 20   BILIRUBIN 0.6   ALK PHOS 71         Lab 23    PROBNP 36.7   HSTROP T 9                 Brief Urine Lab Results  (Last result in the past 365 days)      Color   Clarity   Blood   Leuk Est   Nitrite   Protein   CREAT   Urine HCG        03/24/23 1927 Dark Yellow   Cloudy   Negative   Trace   Positive   100 mg/dL (2+)                 Microbiology Results Abnormal     Procedure Component Value - Date/Time    COVID PRE-OP / PRE-PROCEDURE SCREENING ORDER (NO ISOLATION) - Swab, Nasopharynx [[id number]]  (Normal) Collected: 03/24/23 1853    Lab Status: Final result Specimen: Swab from Nasopharynx Updated: 03/24/23 1920    Narrative:      The following orders were created for panel order COVID PRE-OP / PRE-PROCEDURE SCREENING ORDER (NO ISOLATION) - Swab, Nasopharynx.  Procedure                               Abnormality         Status                     ---------                               -----------         ------                     COVID-19 and FLU A/B PCR...[[id number]]  Normal              Final result                 Please view results for these tests on the individual orders.    COVID-19 and FLU A/B PCR - Swab, Nasopharynx [[id number]]  (Normal) Collected: 03/24/23 1853    Lab Status: Final result Specimen: Swab from Nasopharynx Updated: 03/24/23 1920     COVID19 Not Detected     Influenza A PCR Not Detected     Influenza B PCR Not Detected    Narrative:      Fact sheet for providers: https://www.fda.gov/media/933537/download    Fact sheet for patients: https://www.fda.gov/media/811588/download    Test performed by PCR.          XR Chest 1 View    Result Date: 3/24/2023  XR CHEST 1 VW Date of Exam: 3/24/2023 6:07 PM EDT Indication: SOA triage protocol. Comparison: November 2, 2017 Findings: The heart is not definitely enlarged. There is slight prominence of markings centrally that could relate to pulmonary vessels accentuated secondary to technique. There is a peripheral lucency in the left hemithorax that may relate to a bleb which has developed since prior  study. There are no large pleural effusions     Impression: Impression: 1.Lucency in the more peripheral aspect left hemithorax that may relate to a bleb and has developed since prior study. Electronically Signed: Maury Douglas  3/24/2023 6:21 PM EDT  Workstation ID: [id number]    CT Angiogram Chest    Result Date: 3/24/2023  CT ANGIOGRAM CHEST Date of Exam: 3/24/2023 7:38 PM EDT Indication: SOB, sepsis, r/o pneumonia/ PE. Comparison: None available. Technique: CTA of the chest was performed after the uneventful intravenous administration of 80 mL Isovue-370. Reconstructed coronal and sagittal images were also obtained. In addition, a 3-D volume rendered image was created for interpretation. Automated exposure control and iterative reconstruction methods were used. Findings: No definite pulmonary embolism. Normal caliber of the main pulmonary artery. Unremarkable appearance of the cardiac chambers. No pericardial effusion or coronary artery calcifications. Two-vessel aortic arch, normal variant, with otherwise unremarkable appearance of the thoracic aorta. Homogeneous attenuation of the thyroid gland. There are shotty mediastinal and left hilar lymph nodes which are simply reactive. Unremarkable appearance of the thoracic esophagus. Chest wall soft tissues are normal. No axillary adenopathy. Unremarkable appearance of the partially imaged upper abdomen. No acute or suspicious bony findings. There are trace dependent secretions in the upper trachea; the trachea and mainstem bronchi are grossly patent. There is bronchial wall thickening in the left lower lobe. There are extensive confluent groundglass and subsolid densities throughout the majority of the left lower lobe and inferior lingula, compatible with infection. There is some subpleural groundglass densities in the dependent right lower lobe, favor atelectasis over infection. Otherwise the lungs are grossly clear. No pleural effusion. No pneumothorax.      Impression: Impression: No pulmonary embolus. Pneumonia in the inferior lingula and left lower lobe. Electronically Signed: Albert Nava  3/24/2023 8:07 PM EDT  Workstation ID: [id number]          Current medications:  Scheduled Meds:atorvastatin, 20 mg, Oral, Nightly  brimonidine, 1 drop, Left Eye, BID   And  timolol, 1 drop, Left Eye, Daily  carBAMazepine, 200 mg, Oral, TID  cefTRIAXone, 1 g, Intravenous, Q24H  doxycycline, 100 mg, Intravenous, Q12H  ipratropium-albuterol, 3 mL, Nebulization, 4x Daily - RT  latanoprost, 1 drop, Left Eye, Nightly  multivitamin with minerals, 1 tablet, Oral, QAM  oxybutynin, 5 mg, Oral, TID  sodium chloride, 10 mL, Intravenous, Q12H  traZODone, 50 mg, Oral, Nightly      Continuous Infusions:sodium chloride, 75 mL/hr, Last Rate: 75 mL/hr (03/25/23 0135)      PRN Meds:.•  acetaminophen **OR** acetaminophen **OR** acetaminophen  •  benzonatate  •  ondansetron **OR** ondansetron  •  sodium chloride  •  sodium chloride  •  sodium chloride    Assessment & Plan   Assessment & Plan     Active Hospital Problems    Diagnosis  POA   • **Sepsis ([facility]) [A41.9]  Unknown   • Pneumonia of left lower lobe due to infectious organism [J18.9]  Yes   • Acute UTI (urinary tract infection) [N39.0]  Unknown   • Hyponatremia [E87.1]  Unknown   • TBI (traumatic brain injury) ([facility]) [S06.9XAA]  Unknown   • Familial hypercholesterolemia [E78.01]  Yes   • QUEENIE (generalized anxiety disorder) [F41.1]  Yes   • Hypoxemia [R09.02]  Yes   • Seizure disorder ([facility]) [G40.909]  Yes   • OAB (overactive bladder) [N32.81]  Yes      Resolved Hospital Problems   No resolved problems to display.        Brief Hospital Course to date:  Anderson Rey is a 57 y.o. male w/ Hx TBI (chronic physical/mental deficits), seizure d/o, HLD, hyperactive bladder, who presented w/ cough, fever, anorexia; w/u in the ED w/ borderline hypoxia and imaging concerning for PNA    The following problems are new to me  today    Assessment/Plan    Sepsis 2/2 LLL/lingular pneumonia  -CTA w/o PE, w/ LLL/lingula airspace disease  -cont IV ctx/doxy, if improving in a few days can transition to PO  -supportive care    Bacteruria  -abnl UA, no urinary syx described at admission, follow urine Cx; should be covered w/ Abx for pna    HLD  - home statin    Mild hyponatremia, likely not clinically significant  -appears chronic (limited prior data but present in 2017)  -suspect related to prior TBI and seizure meds    Glaucoma  -home eye drops    TBI  Seizure disorder  -requires increased care  -home carbamazepine, trazodone    Expected Discharge Location and Transportation: home  Expected Discharge   Expected Discharge Date and Time     Expected Discharge Date Expected Discharge Time    Mar 28, 2023            DVT prophylaxis:  Mechanical DVT prophylaxis orders are present.     AM-PAC 6 Clicks Score (PT): 11 (03/24/23 1294)    CODE STATUS:   Code Status and Medical Interventions:   Ordered at: 03/24/23 5412     Level Of Support Discussed With:    Patient     Code Status (Patient has no pulse and is not breathing):    CPR (Attempt to Resuscitate)     Medical Interventions (Patient has pulse or is breathing):    Full Support       Ermias Black, DO  03/25/23

## 2023-03-25 NOTE — PLAN OF CARE
Goal Outcome Evaluation:               PT VSS, No adventitious findings noted upon primary assessment, Caregiver at bedsite, helpful to staff throughout shift. Patient tolerated all medications and repositioning without issue, will continue to monitor and follow patient and physician plan of care per md order.

## 2023-03-26 LAB
ANION GAP SERPL CALCULATED.3IONS-SCNC: 8 MMOL/L (ref 5–15)
BACTERIA SPEC AEROBE CULT: ABNORMAL
BUN SERPL-MCNC: 8 MG/DL (ref 6–20)
BUN/CREAT SERPL: 17.4 (ref 7–25)
CALCIUM SPEC-SCNC: 8.5 MG/DL (ref 8.6–10.5)
CHLORIDE SERPL-SCNC: 98 MMOL/L (ref 98–107)
CO2 SERPL-SCNC: 27 MMOL/L (ref 22–29)
CREAT SERPL-MCNC: 0.46 MG/DL (ref 0.76–1.27)
DEPRECATED RDW RBC AUTO: 41.1 FL (ref 37–54)
EGFRCR SERPLBLD CKD-EPI 2021: 123.5 ML/MIN/1.73
ERYTHROCYTE [DISTWIDTH] IN BLOOD BY AUTOMATED COUNT: 12.5 % (ref 12.3–15.4)
GLUCOSE SERPL-MCNC: 99 MG/DL (ref 65–99)
HCT VFR BLD AUTO: 37.4 % (ref 37.5–51)
HGB BLD-MCNC: 12.5 G/DL (ref 13–17.7)
MCH RBC QN AUTO: 29.9 PG (ref 26.6–33)
MCHC RBC AUTO-ENTMCNC: 33.4 G/DL (ref 31.5–35.7)
MCV RBC AUTO: 89.5 FL (ref 79–97)
PLATELET # BLD AUTO: 222 10*3/MM3 (ref 140–450)
PMV BLD AUTO: 9.9 FL (ref 6–12)
POTASSIUM SERPL-SCNC: 4 MMOL/L (ref 3.5–5.2)
RBC # BLD AUTO: 4.18 10*6/MM3 (ref 4.14–5.8)
SODIUM SERPL-SCNC: 133 MMOL/L (ref 136–145)
WBC NRBC COR # BLD: 6.89 10*3/MM3 (ref 3.4–10.8)

## 2023-03-26 PROCEDURE — 80048 BASIC METABOLIC PNL TOTAL CA: CPT | Performed by: INTERNAL MEDICINE

## 2023-03-26 PROCEDURE — 94664 DEMO&/EVAL PT USE INHALER: CPT

## 2023-03-26 PROCEDURE — 97535 SELF CARE MNGMENT TRAINING: CPT

## 2023-03-26 PROCEDURE — 85027 COMPLETE CBC AUTOMATED: CPT | Performed by: INTERNAL MEDICINE

## 2023-03-26 PROCEDURE — 94799 UNLISTED PULMONARY SVC/PX: CPT

## 2023-03-26 PROCEDURE — 99232 SBSQ HOSP IP/OBS MODERATE 35: CPT | Performed by: INTERNAL MEDICINE

## 2023-03-26 PROCEDURE — 94761 N-INVAS EAR/PLS OXIMETRY MLT: CPT

## 2023-03-26 PROCEDURE — 97165 OT EVAL LOW COMPLEX 30 MIN: CPT

## 2023-03-26 PROCEDURE — 25010000002 CEFTRIAXONE PER 250 MG

## 2023-03-26 PROCEDURE — 97162 PT EVAL MOD COMPLEX 30 MIN: CPT

## 2023-03-26 RX ORDER — DOXYCYCLINE 100 MG/1
100 CAPSULE ORAL EVERY 12 HOURS SCHEDULED
Status: DISCONTINUED | OUTPATIENT
Start: 2023-03-26 | End: 2023-03-29 | Stop reason: HOSPADM

## 2023-03-26 RX ORDER — GUAIFENESIN 600 MG/1
1200 TABLET, EXTENDED RELEASE ORAL EVERY 12 HOURS SCHEDULED
Status: DISCONTINUED | OUTPATIENT
Start: 2023-03-26 | End: 2023-03-29 | Stop reason: HOSPADM

## 2023-03-26 RX ADMIN — CARBAMAZEPINE 200 MG: 200 TABLET ORAL at 20:30

## 2023-03-26 RX ADMIN — TRAZODONE HYDROCHLORIDE 50 MG: 50 TABLET ORAL at 20:30

## 2023-03-26 RX ADMIN — GUAIFENESIN 1200 MG: 600 TABLET, EXTENDED RELEASE ORAL at 09:47

## 2023-03-26 RX ADMIN — GUAIFENESIN 1200 MG: 600 TABLET, EXTENDED RELEASE ORAL at 20:30

## 2023-03-26 RX ADMIN — IPRATROPIUM BROMIDE AND ALBUTEROL SULFATE 3 ML: 2.5; .5 SOLUTION RESPIRATORY (INHALATION) at 20:47

## 2023-03-26 RX ADMIN — Medication 10 ML: at 20:30

## 2023-03-26 RX ADMIN — BENZONATATE 100 MG: 100 CAPSULE ORAL at 00:34

## 2023-03-26 RX ADMIN — CARBAMAZEPINE 200 MG: 200 TABLET ORAL at 15:53

## 2023-03-26 RX ADMIN — SODIUM CHLORIDE 1 G: 900 INJECTION INTRAVENOUS at 20:29

## 2023-03-26 RX ADMIN — ATORVASTATIN CALCIUM 20 MG: 20 TABLET, FILM COATED ORAL at 20:30

## 2023-03-26 RX ADMIN — BENZONATATE 100 MG: 100 CAPSULE ORAL at 20:30

## 2023-03-26 RX ADMIN — IPRATROPIUM BROMIDE AND ALBUTEROL SULFATE 3 ML: 2.5; .5 SOLUTION RESPIRATORY (INHALATION) at 11:07

## 2023-03-26 RX ADMIN — MULTIPLE VITAMINS W/ MINERALS TAB 1 TABLET: TAB at 08:08

## 2023-03-26 RX ADMIN — LATANOPROST 1 DROP: 50 SOLUTION OPHTHALMIC at 20:30

## 2023-03-26 RX ADMIN — OXYBUTYNIN CHLORIDE 5 MG: 5 TABLET ORAL at 08:08

## 2023-03-26 RX ADMIN — TIMOLOL MALEATE 1 DROP: 5 SOLUTION/ DROPS OPHTHALMIC at 08:08

## 2023-03-26 RX ADMIN — DOXYCYCLINE 100 MG: 100 CAPSULE ORAL at 20:37

## 2023-03-26 RX ADMIN — CARBAMAZEPINE 200 MG: 200 TABLET ORAL at 08:08

## 2023-03-26 RX ADMIN — OXYBUTYNIN CHLORIDE 5 MG: 5 TABLET ORAL at 15:53

## 2023-03-26 RX ADMIN — IPRATROPIUM BROMIDE AND ALBUTEROL SULFATE 3 ML: 2.5; .5 SOLUTION RESPIRATORY (INHALATION) at 15:22

## 2023-03-26 RX ADMIN — OXYBUTYNIN CHLORIDE 5 MG: 5 TABLET ORAL at 20:30

## 2023-03-26 RX ADMIN — IPRATROPIUM BROMIDE AND ALBUTEROL SULFATE 3 ML: 2.5; .5 SOLUTION RESPIRATORY (INHALATION) at 07:25

## 2023-03-26 RX ADMIN — Medication 10 ML: at 08:08

## 2023-03-26 RX ADMIN — BRIMONIDINE TARTRATE 1 DROP: 2 SOLUTION OPHTHALMIC at 08:08

## 2023-03-26 RX ADMIN — BRIMONIDINE TARTRATE 1 DROP: 2 SOLUTION OPHTHALMIC at 20:29

## 2023-03-26 RX ADMIN — DOXYCYCLINE 100 MG: 100 INJECTION, POWDER, LYOPHILIZED, FOR SOLUTION INTRAVENOUS at 09:47

## 2023-03-26 NOTE — PLAN OF CARE
Goal Outcome Evaluation:  Plan of Care Reviewed With: patient        Progress: no change   Pt is alert and oriented to self and place; confused. VSS on room air to 2L NC. NSR on tele. Pt has slept off and on throughout the shift. No c/o of pain. PRN tessalon given as ordered. Waffle mattress and SCDS in place. Turned q2hrs. Urinal provided and voiding spontaneously. Will continue to monitor.

## 2023-03-26 NOTE — PLAN OF CARE
Goal Outcome Evaluation:  Plan of Care Reviewed With: patient        Progress: improving  Outcome Evaluation: OT eval complete. Pt is very pleasent and cooperative. Presents near baseline function requiring Keenan for bed mobility and transfers. OT issued large  utensils and T handle cup for self feeding. Extensive time spent on self feeding during session. Pt completes with Keenan from OT and extra time/effort. Minimal spillage noted. Recommend d/c backt o group home with 24/7 caregivers.

## 2023-03-26 NOTE — THERAPY EVALUATION
Patient Name: Anderson Rey  : 1965    MRN: [id number]                              Today's Date: 3/26/2023       Admit Date: 3/24/2023    Visit Dx:     ICD-10-CM ICD-9-CM   1. Pneumonia of left lower lobe due to infectious organism  J18.9 486   2. History of cerebral palsy  Z86.69 V12.49   3. Sepsis, due to unspecified organism, unspecified whether acute organ dysfunction present ([facility])  A41.9 038.9     995.91   4. Urinary tract infection without hematuria, site unspecified  N39.0 599.0     Patient Active Problem List   Diagnosis   • Hypoxemia   • Familial hypercholesterolemia   • Glaucoma   • QUEENIE (generalized anxiety disorder)   • Seizure disorder ([facility])   • OAB (overactive bladder)   • Pneumonia of right lower lobe due to infectious organism   • Acute bronchitis   • Pneumonia of left lower lobe due to infectious organism   • Acute UTI (urinary tract infection)   • Hyponatremia   • TBI (traumatic brain injury) ([facility])   • Sepsis ([facility])     Past Medical History:   Diagnosis Date   • Cerebral palsy, hemiplegic ([facility])    • QUEENIE (generalized anxiety disorder)    • Glaucoma    • HLD (hyperlipidemia)    • OAB (overactive bladder)    • Post-traumatic brain syndrome    • Seizure disorder ([facility])    • Vitamin D deficiency      Past Surgical History:   Procedure Laterality Date   • EXPLORATORY LAPAROTOMY     • PEG TUBE REMOVAL     • TRACHEOSTOMY CLOSURE/STOMA REVISION        General Information     Row Name 23 0910          OT Time and Intention    Document Type evaluation  -     Mode of Treatment occupational therapy  -     Row Name 23 0910          General Information    Patient Profile Reviewed yes  -     Existing Precautions/Restrictions fall;oxygen therapy device and L/min;other (see comments);seizures  CP; TBI; L hemiplegia  -     Barriers to Rehab medically complex;previous functional deficit;cognitive status  -     Row Name 23 0910          Living Environment    People in  Home other (see comments)  group home  -     Row Name 03/26/23 0910          Home Main Entrance    Number of Stairs, Main Entrance none  -HM     Stair Railings, Main Entrance none  -     Row Name 03/26/23 0910          Stairs Within Home, Primary    Stair Railings, Within Home, Primary none  -HM     Row Name 03/26/23 0910          Cognition    Orientation Status (Cognition) oriented to;person;disoriented to;place  -     Row Name 03/26/23 0910          Safety Issues, Functional Mobility    Safety Issues Affecting Function (Mobility) safety precautions follow-through/compliance;safety precaution awareness;insight into deficits/self-awareness;awareness of need for assistance;judgment;problem-solving;sequencing abilities  -     Impairments Affecting Function (Mobility) balance;cognition;endurance/activity tolerance;strength;postural/trunk control;range of motion (ROM);motor control;shortness of breath;muscle tone abnormal;coordination  -     Cognitive Impairments, Mobility Safety/Performance attention;awareness, need for assistance;insight into deficits/self-awareness;judgment;problem-solving/reasoning;sequencing abilities;safety precaution follow-through;safety precaution awareness  -           User Key  (r) = Recorded By, (t) = Taken By, (c) = Cosigned By    Initials Name Provider Type     Debbie Chapman OT Occupational Therapist                 Mobility/ADL's     Row Name 03/26/23 0911          Bed Mobility    Bed Mobility scooting/bridging;supine-sit  -     Scooting/Bridging [place] (Bed Mobility) verbal cues;supervision  -     Supine-Sit [place] (Bed Mobility) verbal cues;minimum assist (75% patient effort)  -     Bed Mobility, Safety Issues decreased use of arms for pushing/pulling;decreased use of legs for bridging/pushing  -     Assistive Device (Bed Mobility) head of bed elevated  -     Comment, (Bed Mobility) minAx1 to advance into sitting.  -     Row Name 03/26/23 0911           Transfers    Transfers sit-stand transfer;bed-chair transfer  -     Row Name 03/26/23 0911          Bed-Chair Transfer    Bed-Chair [place] (Transfers) verbal cues;minimum assist (75% patient effort);2 person assist  -     Assistive Device (Bed-Chair Transfers) other (see comments)  B UE support  -     Row Name 03/26/23 0911          Sit-Stand Transfer    Sit-Stand [place] (Transfers) minimum assist (75% patient effort);2 person assist;verbal cues  -HM     Assistive Device (Sit-Stand Transfers) other (see comments)  B UE support  -     Row Name 03/26/23 0911          Functional Mobility    Functional Mobility- Comment not tested; wheelchair use at baseline.  -     Row Name 03/26/23 0911          Activities of Daily Living    BADL Assessment/Intervention feeding  -     Row Name 03/26/23 0911          Self-Feeding Assessment/Training    [place] Level (Feeding) minimum assist (75% patient effort);liquids to mouth;scoop food and bring to mouth  -     Assistive Devices (Feeding) adapted cup;built-up handle utensils  -     Position (Self-Feeding) supported sitting  -     Comment, (Feeding) Issued large  utensils and T handle cup and educated pt on use. Extensive time spent during session working on self feed. With use of large  utensil bent in toward pt and use of T handle cup with straw pt with good ability to complete self feeding with Keenan from OT. Minimal spillage noted.  -           User Key  (r) = Recorded By, (t) = Taken By, (c) = Cosigned By    Initials Name Provider Type     Debbie Chapman OT Occupational Therapist               Obj/Interventions     Row Name 03/26/23 0915          Sensory Assessment (Somatosensory)    Sensory Assessment (Somatosensory) sensation intact  -     Row Name 03/26/23 0915          Vision Assessment/Intervention    Visual Impairment/Limitations WFL  -     Row Name 03/26/23 0915          Range of Motion Comprehensive     General Range of Motion upper extremity range of motion deficits identified  -     Comment, General Range of Motion R UE hemiplegic. L UE WFL.  -     Row Name 03/26/23 0915          Strength Comprehensive (MMT)    General Manual Muscle Testing (MMT) Assessment upper extremity strength deficits identified  -     Comment, General Manual Muscle Testing (MMT) Assessment R UE hemiplegic;  -     Row Name 03/26/23 0915          Upper Extremity (Manual Muscle Testing)    Upper Extremity: Manual Muscle Testing (MMT) right shoulder strength deficit;left shoulder strength deficit  -     Row Name 03/26/23 0915          Motor Skills    Motor Skills coordination  -     Coordination fine motor deficit;gross motor deficit;bilateral;upper extremity;moderate impairment  -     Row Name 03/26/23 0915          Balance    Balance Assessment sitting static balance;sitting dynamic balance;standing static balance  -     Static Sitting Balance contact guard  -     Dynamic Sitting Balance minimal assist  -     Position, Sitting Balance unsupported;sitting edge of bed  -     Static Standing Balance minimal assist;2-person assist  -     Dynamic Standing Balance minimal assist;2-person assist  -     Position/Device Used, Standing Balance supported  UE support  -     Balance Interventions sitting;occupation based/functional task  -     Row Name 03/26/23 0915          Right Shoulder (Manual Muscle Testing)    Right Shoulder Manual Muscle Testing (MMT) flexion  -     MMT: Flexion, Right Shoulder flexion  -     MMT, Gross Movement: Right Shoulder Flexion (2-/5) poor minus  -     Row Name 03/26/23 0915          Left Shoulder (Manual Muscle Testing)    Left Shoulder Manual Muscle Testing (MMT) flexion  -     MMT: Flexion, Left Shoulder flexion  -     MMT, Gross Movement: Left Shoulder Flexion (4-/5) good minus  -           User Key  (r) = Recorded By, (t) = Taken By, (c) = Cosigned By    Initials Name Provider  Type     Debbie Chapman OT Occupational Therapist               Goals/Plan     Row Name 03/26/23 0918          Bed Mobility Goal 1 (OT)    Activity/Assistive Device (Bed Mobility Goal 1, OT) scooting;sit to supine/supine to sit  -     [place] Level/Cues Needed (Bed Mobility Goal 1, OT) contact guard required;verbal cues required  -     Time Frame (Bed Mobility Goal 1, OT) by discharge;long term goal (LTG)  -HM     Progress/Outcomes (Bed Mobility Goal 1, OT) goal ongoing  -     Row Name 03/26/23 0918          Transfer Goal 1 (OT)    Activity/Assistive Device (Transfer Goal 1, OT) sit-to-stand/stand-to-sit;bed-to-chair/chair-to-bed;toilet  -     [place] Level/Cues Needed (Transfer Goal 1, OT) contact guard required;verbal cues required  -HM     Time Frame (Transfer Goal 1, OT) by discharge;long term goal (LTG)  -HM     Progress/Outcome (Transfer Goal 1, OT) goal ongoing  -     Row Name 03/26/23 0918          Self-Feeding Goal 1 (OT)    Activity/Device (Self-Feeding Goal 1, OT) finger foods;liquids to mouth;scoop food and bring to mouth;adapted cup;built-up handle utensils  -     [place] Level/Cues Needed (Self-Feeding Goal 1, OT) set-up required  -HM     Time Frame (Self-Feeding Goal 1, OT) by discharge;long term goal (LTG)  -HM     Progress/Outcomes (Self-Feeding Goal 1, OT) goal ongoing  -     Row Name 03/26/23 0918          Therapy Assessment/Plan (OT)    Planned Therapy Interventions (OT) BADL retraining;adaptive equipment training;functional balance retraining;occupation/activity based interventions;ROM/therapeutic exercise;transfer/mobility retraining  -           User Key  (r) = Recorded By, (t) = Taken By, (c) = Cosigned By    Initials Name Provider Type     Debbie Chapman OT Occupational Therapist               Clinical Impression     Row Name 03/26/23 0916          Pain Assessment    Pretreatment Pain Rating 0/10 - no pain  -     Posttreatment Pain Rating 0/10 -  no pain  -     Row Name 03/26/23 0916          Plan of Care Review    Plan of Care Reviewed With patient  -HM     Progress improving  -     Outcome Evaluation OT eval complete. Pt is very pleasent and cooperative. Presents near baseline function requiring Keenan for bed mobility and transfers. OT issued large  utensils and T handle cup for self feeding. Extensive time spent on self feeding during session. Pt completes with Keenan from OT and extra time/effort. Minimal spillage noted. Recommend d/c backt o group home with 24/7 caregivers.  -     Row Name 03/26/23 0916          Therapy Assessment/Plan (OT)    Patient/Family Therapy Goal Statement (OT) Pt would like to improve and return home.  -     Rehab Potential (OT) good, to achieve stated therapy goals  -     Criteria for Skilled Therapeutic Interventions Met (OT) yes;skilled treatment is necessary  -     Therapy Frequency (OT) daily  -     Row Name 03/26/23 0916          Therapy Plan Review/Discharge Plan (OT)    Anticipated Discharge Disposition (OT) other (see comments)  group home  -     Row Name 03/26/23 0916          Vital Signs    Pre Systolic BP Rehab --  RN cleared for tx; VSS  -HM     Pre SpO2 (%) 87  -HM     O2 Delivery Pre Treatment supplemental O2  -HM     Intra SpO2 (%) 92  -HM     O2 Delivery Intra Treatment supplemental O2  -HM     Post SpO2 (%) 95  -HM     O2 Delivery Post Treatment supplemental O2  -HM     Pre Patient Position Supine  -HM     Intra Patient Position Standing  -HM     Post Patient Position Sitting  -     Row Name 03/26/23 0916          Positioning and Restraints    Pre-Treatment Position in bed  -     Post Treatment Position chair  -HM     In Chair notified nsg;reclined;call light within reach;encouraged to call for assist;exit alarm on;waffle cushion;on mechanical lift sling  -           User Key  (r) = Recorded By, (t) = Taken By, (c) = Cosigned By    Initials Name Provider Type     Debbie Chapman, OT  Occupational Therapist               Outcome Measures     Row Name 03/26/23 0919          How much help from another is currently needed...    Putting on and taking off regular lower body clothing? 1  -HM     Bathing (including washing, rinsing, and drying) 1  -HM     Toileting (which includes using toilet bed pan or urinal) 1  -HM     Putting on and taking off regular upper body clothing 2  -HM     Taking care of personal grooming (such as brushing teeth) 3  -HM     Eating meals 3  -HM     AM-PAC 6 Clicks Score (OT) 11  -HM     Row Name 03/26/23 0830 03/26/23 0814       How much help from another person do you currently need...    Turning from your back to your side while in flat bed without using bedrails? 3  -CB 3  -LR    Moving from lying on back to sitting on the side of a flat bed without bedrails? 3  -CB 3  -LR    Moving to and from a bed to a chair (including a wheelchair)? 2  -CB 2  -LR    Standing up from a chair using your arms (e.g., wheelchair, bedside chair)? 2  -CB 2  -LR    Climbing 3-5 steps with a railing? 1  -CB 1  -LR    To walk in hospital room? 1  -CB 1  -LR    AM-PAC 6 Clicks Score (PT) 12  -CB 12  -LR    Highest level of mobility 4 --> Transferred to chair/commode  -CB 4 --> Transferred to chair/commode  -LR    Row Name 03/26/23 0919 03/26/23 0814       Functional Assessment    Outcome Measure Options AM-PAC 6 Clicks Daily Activity (OT)  -HM AM-PAC 6 Clicks Basic Mobility (PT)  -LR          User Key  (r) = Recorded By, (t) = Taken By, (c) = Cosigned By    Initials Name Provider Type    LR Yeny Bañuelos, PT Physical Therapist    CB Ana Cortez, RN Registered Nurse    HM Debbie Chapman, OT Occupational Therapist                Occupational Therapy Education     Title: PT OT SLP Therapies (In Progress)     Topic: Occupational Therapy (In Progress)     Point: ADL training (Done)     Description:   Instruct learner(s) on proper safety adaptation and remediation techniques during self  care or transfers.   Instruct in proper use of assistive devices.              Learning Progress Summary           Patient Acceptance, E,TB,D, VU,NR by  at 3/26/2023 0919                   Point: Home exercise program (Not Started)     Description:   Instruct learner(s) on appropriate technique for monitoring, assisting and/or progressing therapeutic exercises/activities.              Learner Progress:  Not documented in this visit.          Point: Precautions (Done)     Description:   Instruct learner(s) on prescribed precautions during self-care and functional transfers.              Learning Progress Summary           Patient Acceptance, E,TB,D, VU,NR by  at 3/26/2023 0919                   Point: Body mechanics (Done)     Description:   Instruct learner(s) on proper positioning and spine alignment during self-care, functional mobility activities and/or exercises.              Learning Progress Summary           Patient Acceptance, E,TB,D, VU,NR by  at 3/26/2023 0919                               User Key     Initials Effective Dates Name Provider Type Discipline     10/25/22 -  Debbie Chapman OT Occupational Therapist OT              OT Recommendation and Plan  Planned Therapy Interventions (OT): BADL retraining, adaptive equipment training, functional balance retraining, occupation/activity based interventions, ROM/therapeutic exercise, transfer/mobility retraining  Therapy Frequency (OT): daily  Plan of Care Review  Plan of Care Reviewed With: patient  Progress: improving  Outcome Evaluation: OT eval complete. Pt is very pleasent and cooperative. Presents near baseline function requiring Keenan for bed mobility and transfers. OT issued large  utensils and T handle cup for self feeding. Extensive time spent on self feeding during session. Pt completes with Keenan from OT and extra time/effort. Minimal spillage noted. Recommend d/c backt o group home with 24/7 caregivers.     Time Calculation:     Time Calculation- OT     Row Name 03/26/23 0812             Time Calculation- OT    OT Start Time 0812  -HM      OT Received On 03/26/23  -HM      OT Goal Re-Cert Due Date 04/05/23  -HM         Timed Charges    69407 - OT Self Care/Mgmt Minutes 15  -HM         Untimed Charges    OT Eval/Re-eval Minutes 46  -HM         Total Minutes    Timed Charges Total Minutes 15  -HM      Untimed Charges Total Minutes 46  -HM       Total Minutes 61  -HM            User Key  (r) = Recorded By, (t) = Taken By, (c) = Cosigned By    Initials Name Provider Type     Debbie Chapman, OT Occupational Therapist              Therapy Charges for Today     Code Description Service Date Service Provider Modifiers Qty    49900290386 HC OT SELF CARE/MGMT/TRAIN EA 15 MIN 3/26/2023 Debbie Chapman, OT GO 1    46789715306 HC OT EVAL LOW COMPLEXITY 4 3/26/2023 Debbie Chapman OT GO 1               Debbie Chapman OT  3/26/2023

## 2023-03-26 NOTE — THERAPY EVALUATION
Patient Name: Anderson Rey  : 1965    MRN: [id number]                              Today's Date: 3/26/2023       Admit Date: 3/24/2023    Visit Dx:     ICD-10-CM ICD-9-CM   1. Pneumonia of left lower lobe due to infectious organism  J18.9 486   2. History of cerebral palsy  Z86.69 V12.49   3. Sepsis, due to unspecified organism, unspecified whether acute organ dysfunction present ([facility])  A41.9 038.9     995.91   4. Urinary tract infection without hematuria, site unspecified  N39.0 599.0     Patient Active Problem List   Diagnosis   • Hypoxemia   • Familial hypercholesterolemia   • Glaucoma   • QUEENIE (generalized anxiety disorder)   • Seizure disorder ([facility])   • OAB (overactive bladder)   • Pneumonia of right lower lobe due to infectious organism   • Acute bronchitis   • Pneumonia of left lower lobe due to infectious organism   • Acute UTI (urinary tract infection)   • Hyponatremia   • TBI (traumatic brain injury) ([facility])   • Sepsis ([facility])     Past Medical History:   Diagnosis Date   • Cerebral palsy, hemiplegic ([facility])    • QUEENIE (generalized anxiety disorder)    • Glaucoma    • HLD (hyperlipidemia)    • OAB (overactive bladder)    • Post-traumatic brain syndrome    • Seizure disorder ([facility])    • Vitamin D deficiency      Past Surgical History:   Procedure Laterality Date   • EXPLORATORY LAPAROTOMY     • PEG TUBE REMOVAL     • TRACHEOSTOMY CLOSURE/STOMA REVISION        General Information     Row Name 23 0814          Physical Therapy Time and Intention    Document Type evaluation  -LR     Mode of Treatment physical therapy  -LR     Row Name 23 0814          General Information    Patient Profile Reviewed yes  -LR     Prior Level of Function independent:;transfer;w/c or scooter;bed mobility  per patient he can t/f independently to and from his w/c.  -LR     Existing Precautions/Restrictions fall;oxygen therapy device and L/min;other (see comments);seizures  CP, TBI, L hemiplegia  -LR      Barriers to Rehab medically complex;previous functional deficit;cognitive status  -LR     Row Name 03/26/23 0814          Living Environment    People in Home other (see comments)  group home  -LR     Row Name 03/26/23 0814          Home Main Entrance    Number of Stairs, Main Entrance none  -LR     Row Name 03/26/23 0814          Stairs Within Home, Primary    Number of Stairs, Within Home, Primary none  -LR     Row Name 03/26/23 0814          Cognition    Orientation Status (Cognition) oriented to;person  -LR     Row Name 03/26/23 0814          Safety Issues, Functional Mobility    Safety Issues Affecting Function (Mobility) insight into deficits/self-awareness;awareness of need for assistance  -LR     Impairments Affecting Function (Mobility) balance;cognition;endurance/activity tolerance;strength;postural/trunk control;range of motion (ROM);motor control;shortness of breath;muscle tone abnormal;coordination  -LR           User Key  (r) = Recorded By, (t) = Taken By, (c) = Cosigned By    Initials Name Provider Type    LR Yeny Bañuelos, PT Physical Therapist               Mobility     Row Name 03/26/23 0814          Bed Mobility    Bed Mobility supine-sit  -LR     Supine-Sit [place] (Bed Mobility) verbal cues;minimum assist (75% patient effort)  -LR     Assistive Device (Bed Mobility) head of bed elevated  -LR     Comment, (Bed Mobility) Verbal cues to move LEs towards EOB and to push up from bed to raise trunk into sitting and to scoot hips out to get feet on floor. Min assist required to pull trunk into sitting. Patient denied dizziness upon sitting up. Verbal cues to push from bed to scoot hips over and angled in direction of t/f. Mild LOB posteriorly sitting EOB when changing gown.  -LR     Row Name 03/26/23 0814          Transfers    Comment, (Transfers) Verbal cues to push up from bed to stand and to reach back for chair to lower into sitting. Verbal cues for steps or pivoting to chair. Patient  able to pivot and scoot on L foot to turn and sit in chair.  -LR     Row Name 03/26/23 0814          Bed-Chair Transfer    Bed-Chair [place] (Transfers) verbal cues;minimum assist (75% patient effort);2 person assist  -LR     Assistive Device (Bed-Chair Transfers) other (see comments)  B UE support  -LR     Row Name 03/26/23 0814          Sit-Stand Transfer    Sit-Stand [place] (Transfers) verbal cues;minimum assist (75% patient effort);2 person assist  -LR     Assistive Device (Sit-Stand Transfers) other (see comments)  B UE support  -LR     Row Name 03/26/23 0814          Gait/Stairs (Locomotion)    [place] Level (Gait) not tested  -LR     [place] Level (Stairs) not tested  -LR     Comment, (Gait/Stairs) Patient is non-ambulatory at baseline.  -LR           User Key  (r) = Recorded By, (t) = Taken By, (c) = Cosigned By    Initials Name Provider Type    LR Yeny Bañuelos, PT Physical Therapist               Obj/Interventions     Row Name 03/26/23 0814          Range of Motion Comprehensive    General Range of Motion lower extremity range of motion deficits identified  -LR     Row Name 03/26/23 0814          Strength Comprehensive (MMT)    General Manual Muscle Testing (MMT) Assessment lower extremity strength deficits identified  -LR     Row Name 03/26/23 0814          Motor Skills    Motor Skills muscle tone  -LR     Muscle Tone right;lower extremity(s);mild impairment;hypertonia  -LR     Row Name 03/26/23 0814          Balance    Balance Assessment sitting static balance;sitting dynamic balance;standing static balance;standing dynamic balance  -LR     Static Sitting Balance contact guard  -LR     Dynamic Sitting Balance minimal assist  -LR     Position, Sitting Balance supported;sitting edge of bed  -LR     Static Standing Balance 2-person assist;contact guard  -LR     Dynamic Standing Balance 2-person assist;minimal assist  -LR     Position/Device Used, Standing Balance  supported;other (see comments)  B UE support  -LR     Row Name 03/26/23 0814          Sensory Assessment (Somatosensory)    Sensory Assessment (Somatosensory) LE sensation intact;other (see comments)  denies numbness/tingling; reports light touch equal and intact upon assessment  -LR     Row Name 03/26/23 0814          General Lower Extremity Assessment (Range of Motion)    Comment: Lower Extremity ROM B hamstring tightness noted, limiting full extension at knees bilaterally, unable to DF to neutral bilaterally  -LR     Row Name 03/26/23 0814          Lower Extremity (Manual Muscle Testing)    Comment, MMT: Lower Extremity R LE 4/5 throughout, L LE 4+/5 throughout  -LR           User Key  (r) = Recorded By, (t) = Taken By, (c) = Cosigned By    Initials Name Provider Type    LR Yeny Bañuelos, PT Physical Therapist               Goals/Plan     Row Name 03/26/23 0814          Bed Mobility Goal 1 (PT)    Activity/Assistive Device (Bed Mobility Goal 1, PT) sit to supine/supine to sit  -LR     [place] Level/Cues Needed (Bed Mobility Goal 1, PT) modified independence  -LR     Time Frame (Bed Mobility Goal 1, PT) long term goal (LTG);5 days  -LR     Progress/Outcomes (Bed Mobility Goal 1, PT) goal ongoing  -LR     Row Name 03/26/23 0814          Transfer Goal 1 (PT)    Activity/Assistive Device (Transfer Goal 1, PT) sit-to-stand/stand-to-sit;bed-to-chair/chair-to-bed  -LR     [place] Level/Cues Needed (Transfer Goal 1, PT) contact guard required  -LR     Time Frame (Transfer Goal 1, PT) long term goal (LTG);5 days  -LR     Progress/Outcome (Transfer Goal 1, PT) goal ongoing  -LR     Row Name 03/26/23 0814          Problem Specific Goal 1 (PT)    Problem Specific Goal 1 (PT) independently propel w/c 150 feet  -LR     Time Frame (Problem Specific Goal 1, PT) long-term goal (LTG);5 days  -LR     Progress/Outcome (Problem Specific Goal 1, PT) goal ongoing  -LR     Row Name 03/26/23 0814          Therapy  Assessment/Plan (PT)    Planned Therapy Interventions (PT) balance training;bed mobility training;home exercise program;patient/family education;transfer training;strengthening;wheelchair management/propulsion training  -LR           User Key  (r) = Recorded By, (t) = Taken By, (c) = Cosigned By    Initials Name Provider Type    LR Yeny Bañuelos, PT Physical Therapist               Clinical Impression     Row Name 03/26/23 0814          Pain    Pretreatment Pain Rating 0/10 - no pain  -LR     Posttreatment Pain Rating 0/10 - no pain  -LR     Pain Intervention(s) Ambulation/increased activity;Repositioned  -LR     Row Name 03/26/23 0814          Plan of Care Review    Plan of Care Reviewed With patient  -LR     Progress improving  -LR     Outcome Evaluation Patient able to stand pivot t/f from bed to chair with min assist x2. Per patient, he can perform this independently at baseline. Patient demonstrates decreased functional mobility status, decreased strength, and impaired balance. Will address these deficits to promote return to PLOF. Recommend d/c back to group home with 24/7 caregivers.  -LR     Row Name 03/26/23 0814          Therapy Assessment/Plan (PT)    Patient/Family Therapy Goals Statement (PT) none stated  -LR     Rehab Potential (PT) good, to achieve stated therapy goals  -LR     Criteria for Skilled Interventions Met (PT) yes;meets criteria;skilled treatment is necessary  -LR     Therapy Frequency (PT) daily  -LR     Row Name 03/26/23 0814          Vital Signs    Pre SpO2 (%) 87  -LR     O2 Delivery Pre Treatment supplemental O2  -LR     Post SpO2 (%) 93  -LR     O2 Delivery Post Treatment supplemental O2  -LR     Pre Patient Position Supine  -LR     Post Patient Position Sitting  -LR     Row Name 03/26/23 0814          Positioning and Restraints    Pre-Treatment Position in bed  -LR     Post Treatment Position chair  -LR     In Chair notified nsg;reclined;sitting;call light within  reach;encouraged to call for assist;exit alarm on;legs elevated;compression device;waffle cushion;on mechanical lift sling  -LR           User Key  (r) = Recorded By, (t) = Taken By, (c) = Cosigned By    Initials Name Provider Type    Yeny Kearns, PT Physical Therapist               Outcome Measures     Row Name 03/26/23 0814          How much help from another person do you currently need...    Turning from your back to your side while in flat bed without using bedrails? 3  -LR     Moving from lying on back to sitting on the side of a flat bed without bedrails? 3  -LR     Moving to and from a bed to a chair (including a wheelchair)? 2  -LR     Standing up from a chair using your arms (e.g., wheelchair, bedside chair)? 2  -LR     Climbing 3-5 steps with a railing? 1  -LR     To walk in hospital room? 1  -LR     AM-PAC 6 Clicks Score (PT) 12  -LR     Highest level of mobility 4 --> Transferred to chair/commode  -LR     Row Name 03/26/23 0814          Functional Assessment    Outcome Measure Options AM-PAC 6 Clicks Basic Mobility (PT)  -LR           User Key  (r) = Recorded By, (t) = Taken By, (c) = Cosigned By    Initials Name Provider Type    Yeny Kearns, PT Physical Therapist                             Physical Therapy Education     Title: PT OT SLP Therapies (Done)     Topic: Physical Therapy (Done)     Point: Mobility training (Done)     Learning Progress Summary           Patient Acceptance, E,D, VU,NR by LR at 3/26/2023 0814    Comment: Educated on benefits of mobility and being OOB. Educated on safety with mobility, correct supine to sit t/f technique, correct sit<->stand t/f technique, correct bed to chair t/f technique, and progression of POC.                   Point: Home exercise program (Done)     Learning Progress Summary           Patient Acceptance, E,D, VU,NR by LR at 3/26/2023 0814    Comment: Educated on benefits of mobility and being OOB. Educated on safety with  mobility, correct supine to sit t/f technique, correct sit<->stand t/f technique, correct bed to chair t/f technique, and progression of POC.                   Point: Body mechanics (Done)     Learning Progress Summary           Patient Acceptance, E,D, VU,NR by LR at 3/26/2023 0814    Comment: Educated on benefits of mobility and being OOB. Educated on safety with mobility, correct supine to sit t/f technique, correct sit<->stand t/f technique, correct bed to chair t/f technique, and progression of POC.                   Point: Precautions (Done)     Learning Progress Summary           Patient Acceptance, E,D, VU,NR by LR at 3/26/2023 0814    Comment: Educated on benefits of mobility and being OOB. Educated on safety with mobility, correct supine to sit t/f technique, correct sit<->stand t/f technique, correct bed to chair t/f technique, and progression of POC.                               User Key     Initials Effective Dates Name Provider Type Discipline    LR 02/03/23 -  Yeny Bañuelos, PT Physical Therapist PT              PT Recommendation and Plan  Planned Therapy Interventions (PT): balance training, bed mobility training, home exercise program, patient/family education, transfer training, strengthening, wheelchair management/propulsion training  Plan of Care Reviewed With: patient  Progress: improving  Outcome Evaluation: Patient able to stand pivot t/f from bed to chair with min assist x2. Per patient, he can perform this independently at baseline. Patient demonstrates decreased functional mobility status, decreased strength, and impaired balance. Will address these deficits to promote return to PLOF. Recommend d/c back to group home with 24/7 caregivers.     Time Calculation:    PT Charges     Row Name 03/26/23 0814             Time Calculation    Start Time 0814  -LR      PT Received On 03/26/23  -      PT Goal Re-Cert Due Date 04/05/23  -         Untimed Charges    PT Eval/Re-eval Minutes  50  -LR         Total Minutes    Untimed Charges Total Minutes 50  -LR       Total Minutes 50  -LR            User Key  (r) = Recorded By, (t) = Taken By, (c) = Cosigned By    Initials Name Provider Type    LR Yeny Bañuelos, PT Physical Therapist              Therapy Charges for Today     Code Description Service Date Service Provider Modifiers Qty    [id number] HC PT EVAL MOD COMPLEXITY 4 3/26/2023 Yeny Bañuelos, PT GP 1          PT G-Codes  Outcome Measure Options: AM-PAC 6 Clicks Basic Mobility (PT)  AM-PAC 6 Clicks Score (PT): 12  PT Discharge Summary  Anticipated Discharge Disposition (PT): adult foster care/group home, home with 24/7 care    Yeny Bañuelos, PT  3/26/2023

## 2023-03-26 NOTE — PROGRESS NOTES
[facility] Medicine Services  PROGRESS NOTE    Patient Name: Anderson Rey  : 1965  MRN: [id number]    Date of Admission: 3/24/2023  Primary Care Physician: Yelena Fuentes MD    Subjective   Subjective     CC:  F/u pna    HPI:  Patient denies complaints. Working with OT, had inc O2 reqs this AM but slightly improved when up in the chair.    ROS:  Gen- No fevers, chills  CV- No chest pain, palpitations  Resp- Yes cough, no dyspnea  GI- No N/V/D, abd pain     Objective   Objective     Vital Signs:   Temp:  [97.5 °F (36.4 °C)-99.2 °F (37.3 °C)] 98.5 °F (36.9 °C)  Heart Rate:  [] 91  Resp:  [16-22] 20  BP: (115-131)/(50-80) 116/50  Flow (L/min):  [2] 2     Physical Exam:  Constitutional: Awake, alert, NAD, sitting up in bed  HENT: NCAT, mucous membranes moist  Respiratory: Clear to auscultation bilaterally, respiratory effort normal, intermittent cough  Cardiovascular: RRR, palpable radial pulses  Gastrointestinal: Positive bowel sounds, soft, nontender, nondistended  Musculoskeletal: No bilateral ankle edema  Psychiatric: Appropriate affect, cooperative  Neurologic: Contractures of UE's, speech fluent    Results Reviewed:  LAB RESULTS:      Lab 23  0523  0402 23  1841   WBC 6.89 10.26 10.84*   HEMOGLOBIN 12.5* 12.9* 15.2   HEMATOCRIT 37.4* 38.0 44.4   PLATELETS 222 191 211   NEUTROS ABS  --  7.10* 9.27*   IMMATURE GRANS (ABS)  --  0.05 0.03   LYMPHS ABS  --  1.86 0.67*   MONOS ABS  --  1.17* 0.84   EOS ABS  --  0.05 0.00   MCV 89.5 89.0 87.7   PROCALCITONIN  --   --  2.09*   LACTATE  --   --  1.2         Lab 23  0529 23  0402 23  1841   SODIUM 133* 130* 130*   POTASSIUM 4.0 3.8 3.7   CHLORIDE 98 96* 94*   CO2 27.0 27.0 25.0   ANION GAP 8.0 7.0 11.0   BUN 8 10 12   CREATININE 0.46* 0.53* 0.57*   EGFR 123.5 118.4 115.8   GLUCOSE 99 95 124*   CALCIUM 8.5* 8.0* 9.1   MAGNESIUM  --  1.7  --          Lab 23  1841   TOTAL PROTEIN  7.3   ALBUMIN 3.9   GLOBULIN 3.4   ALT (SGPT) 20   AST (SGOT) 20   BILIRUBIN 0.6   ALK PHOS 71         Lab 03/24/23  1841   PROBNP 36.7   HSTROP T 9                 Brief Urine Lab Results  (Last result in the past 365 days)      Color   Clarity   Blood   Leuk Est   Nitrite   Protein   CREAT   Urine HCG        03/24/23 1927 Dark Yellow   Cloudy   Negative   Trace   Positive   100 mg/dL (2+)                 Microbiology Results Abnormal     Procedure Component Value - Date/Time    Blood Culture - Blood, Hand, Right [[id number]]  (Normal) Collected: 03/24/23 1839    Lab Status: Preliminary result Specimen: Blood from Hand, Right Updated: 03/25/23 1931     Blood Culture No growth at 24 hours    Blood Culture - Blood, Arm, Left [[id number]]  (Normal) Collected: 03/24/23 1623    Lab Status: Preliminary result Specimen: Blood from Arm, Left Updated: 03/25/23 1931     Blood Culture No growth at 24 hours    COVID PRE-OP / PRE-PROCEDURE SCREENING ORDER (NO ISOLATION) - Swab, Nasopharynx [[id number]]  (Normal) Collected: 03/24/23 1853    Lab Status: Final result Specimen: Swab from Nasopharynx Updated: 03/24/23 1920    Narrative:      The following orders were created for panel order COVID PRE-OP / PRE-PROCEDURE SCREENING ORDER (NO ISOLATION) - Swab, Nasopharynx.  Procedure                               Abnormality         Status                     ---------                               -----------         ------                     COVID-19 and FLU A/B PCR...[[id number]]  Normal              Final result                 Please view results for these tests on the individual orders.    COVID-19 and FLU A/B PCR - Swab, Nasopharynx [[id number]]  (Normal) Collected: 03/24/23 1853    Lab Status: Final result Specimen: Swab from Nasopharynx Updated: 03/24/23 1920     COVID19 Not Detected     Influenza A PCR Not Detected     Influenza B PCR Not Detected    Narrative:      Fact sheet for providers:  https://www.fda.gov/media/491299/download    Fact sheet for patients: https://www.fda.gov/media/054461/download    Test performed by PCR.          XR Chest 1 View    Result Date: 3/24/2023  XR CHEST 1 VW Date of Exam: 3/24/2023 6:07 PM EDT Indication: SOA triage protocol. Comparison: November 2, 2017 Findings: The heart is not definitely enlarged. There is slight prominence of markings centrally that could relate to pulmonary vessels accentuated secondary to technique. There is a peripheral lucency in the left hemithorax that may relate to a bleb which has developed since prior study. There are no large pleural effusions     Impression: Impression: 1.Lucency in the more peripheral aspect left hemithorax that may relate to a bleb and has developed since prior study. Electronically Signed: Maury Douglas  3/24/2023 6:21 PM EDT  Workstation ID: [id number]    CT Angiogram Chest    Result Date: 3/24/2023  CT ANGIOGRAM CHEST Date of Exam: 3/24/2023 7:38 PM EDT Indication: SOB, sepsis, r/o pneumonia/ PE. Comparison: None available. Technique: CTA of the chest was performed after the uneventful intravenous administration of 80 mL Isovue-370. Reconstructed coronal and sagittal images were also obtained. In addition, a 3-D volume rendered image was created for interpretation. Automated exposure control and iterative reconstruction methods were used. Findings: No definite pulmonary embolism. Normal caliber of the main pulmonary artery. Unremarkable appearance of the cardiac chambers. No pericardial effusion or coronary artery calcifications. Two-vessel aortic arch, normal variant, with otherwise unremarkable appearance of the thoracic aorta. Homogeneous attenuation of the thyroid gland. There are shotty mediastinal and left hilar lymph nodes which are simply reactive. Unremarkable appearance of the thoracic esophagus. Chest wall soft tissues are normal. No axillary adenopathy. Unremarkable appearance of the partially imaged upper  abdomen. No acute or suspicious bony findings. There are trace dependent secretions in the upper trachea; the trachea and mainstem bronchi are grossly patent. There is bronchial wall thickening in the left lower lobe. There are extensive confluent groundglass and subsolid densities throughout the majority of the left lower lobe and inferior lingula, compatible with infection. There is some subpleural groundglass densities in the dependent right lower lobe, favor atelectasis over infection. Otherwise the lungs are grossly clear. No pleural effusion. No pneumothorax.     Impression: Impression: No pulmonary embolus. Pneumonia in the inferior lingula and left lower lobe. Electronically Signed: Albert Nava  3/24/2023 8:07 PM EDT  Workstation ID: [id number]          Current medications:  Scheduled Meds:atorvastatin, 20 mg, Oral, Nightly  brimonidine, 1 drop, Left Eye, BID   And  timolol, 1 drop, Left Eye, Daily  carBAMazepine, 200 mg, Oral, TID  cefTRIAXone, 1 g, Intravenous, Q24H  doxycycline, 100 mg, Intravenous, Q12H  guaiFENesin, 1,200 mg, Oral, Q12H  ipratropium-albuterol, 3 mL, Nebulization, 4x Daily - RT  latanoprost, 1 drop, Left Eye, Nightly  multivitamin with minerals, 1 tablet, Oral, QAM  oxybutynin, 5 mg, Oral, TID  sodium chloride, 10 mL, Intravenous, Q12H  traZODone, 50 mg, Oral, Nightly      Continuous Infusions:   PRN Meds:.•  acetaminophen **OR** acetaminophen **OR** acetaminophen  •  benzonatate  •  ondansetron **OR** ondansetron  •  sodium chloride  •  sodium chloride  •  sodium chloride    Assessment & Plan   Assessment & Plan     Active Hospital Problems    Diagnosis  POA   • **Sepsis (HCC) [A41.9]  Unknown   • Pneumonia of left lower lobe due to infectious organism [J18.9]  Yes   • Acute UTI (urinary tract infection) [N39.0]  Unknown   • Hyponatremia [E87.1]  Unknown   • TBI (traumatic brain injury) ([facility]) [S06.9XAA]  Unknown   • Familial hypercholesterolemia [E78.01]  Yes   • QUEENIE (generalized  anxiety disorder) [F41.1]  Yes   • Hypoxemia [R09.02]  Yes   • Seizure disorder (HCC) [G40.909]  Yes   • OAB (overactive bladder) [N32.81]  Yes      Resolved Hospital Problems   No resolved problems to display.        Brief Hospital Course to date:  Anderson Rey is a 57 y.o. male w/ Hx TBI (chronic physical/mental deficits), seizure d/o, HLD, hyperactive bladder, who presented w/ cough, fever, anorexia; w/u in the ED w/ borderline hypoxia and imaging concerning for PNA    Assessment/Plan    Sepsis 2/2 LLL/lingular pneumonia  -CTA w/o PE, w/ LLL/lingula airspace disease  -cont IV ctx/doxy, if improving in a few days can transition to PO  -supportive care; add mucinex and flutter valve    Bacteruria  -abnl UA, no urinary syx described at admission, UrCx w/ 50k CFU GPC, final result pend    HLD  - home statin    Mild hyponatremia, likely not clinically significant  -appears chronic (limited prior data but present in 2017)  -suspect related to prior TBI and seizure meds  -stable    Glaucoma  -home eye drops    TBI  Seizure disorder  -requires increased care  -home carbamazepine, trazodone    Expected Discharge Location and Transportation: home  Expected Discharge   Expected Discharge Date and Time     Expected Discharge Date Expected Discharge Time    Mar 28, 2023            DVT prophylaxis:  Mechanical DVT prophylaxis orders are present.     AM-PAC 6 Clicks Score (PT): 11 (03/24/23 3421)    CODE STATUS:   Code Status and Medical Interventions:   Ordered at: 03/24/23 2574     Level Of Support Discussed With:    Patient     Code Status (Patient has no pulse and is not breathing):    CPR (Attempt to Resuscitate)     Medical Interventions (Patient has pulse or is breathing):    Full Support       Ermias Black, DO  03/26/23

## 2023-03-26 NOTE — PLAN OF CARE
Goal Outcome Evaluation:  Plan of Care Reviewed With: patient        Progress: improving  Outcome Evaluation: Patient able to stand pivot t/f from bed to chair with min assist x2. Per patient, he can perform this independently at baseline. Patient demonstrates decreased functional mobility status, decreased strength, and impaired balance. Will address these deficits to promote return to PLOF. Recommend d/c back to group home with 24/7 caregivers.

## 2023-03-27 ENCOUNTER — APPOINTMENT (OUTPATIENT)
Dept: GENERAL RADIOLOGY | Facility: HOSPITAL | Age: 58
DRG: 194 | End: 2023-03-27
Payer: MEDICARE

## 2023-03-27 PROCEDURE — 99231 SBSQ HOSP IP/OBS SF/LOW 25: CPT | Performed by: PHYSICIAN ASSISTANT

## 2023-03-27 PROCEDURE — 94799 UNLISTED PULMONARY SVC/PX: CPT

## 2023-03-27 PROCEDURE — 94761 N-INVAS EAR/PLS OXIMETRY MLT: CPT

## 2023-03-27 PROCEDURE — 94664 DEMO&/EVAL PT USE INHALER: CPT

## 2023-03-27 PROCEDURE — 25010000002 CEFTRIAXONE PER 250 MG

## 2023-03-27 PROCEDURE — 74230 X-RAY XM SWLNG FUNCJ C+: CPT

## 2023-03-27 PROCEDURE — 92610 EVALUATE SWALLOWING FUNCTION: CPT | Performed by: SPEECH-LANGUAGE PATHOLOGIST

## 2023-03-27 PROCEDURE — 92611 MOTION FLUOROSCOPY/SWALLOW: CPT

## 2023-03-27 RX ADMIN — CARBAMAZEPINE 200 MG: 200 TABLET ORAL at 09:50

## 2023-03-27 RX ADMIN — GUAIFENESIN 1200 MG: 600 TABLET, EXTENDED RELEASE ORAL at 09:50

## 2023-03-27 RX ADMIN — OXYBUTYNIN CHLORIDE 5 MG: 5 TABLET ORAL at 09:50

## 2023-03-27 RX ADMIN — DOXYCYCLINE 100 MG: 100 CAPSULE ORAL at 09:50

## 2023-03-27 RX ADMIN — CARBAMAZEPINE 200 MG: 200 TABLET ORAL at 21:05

## 2023-03-27 RX ADMIN — OXYBUTYNIN CHLORIDE 5 MG: 5 TABLET ORAL at 17:10

## 2023-03-27 RX ADMIN — ATORVASTATIN CALCIUM 20 MG: 20 TABLET, FILM COATED ORAL at 21:05

## 2023-03-27 RX ADMIN — IPRATROPIUM BROMIDE AND ALBUTEROL SULFATE 3 ML: 2.5; .5 SOLUTION RESPIRATORY (INHALATION) at 09:21

## 2023-03-27 RX ADMIN — BRIMONIDINE TARTRATE 1 DROP: 2 SOLUTION OPHTHALMIC at 21:04

## 2023-03-27 RX ADMIN — GUAIFENESIN 1200 MG: 600 TABLET, EXTENDED RELEASE ORAL at 21:05

## 2023-03-27 RX ADMIN — DOXYCYCLINE 100 MG: 100 CAPSULE ORAL at 21:05

## 2023-03-27 RX ADMIN — Medication 10 ML: at 09:50

## 2023-03-27 RX ADMIN — BARIUM SULFATE 20 ML: 400 PASTE ORAL at 15:20

## 2023-03-27 RX ADMIN — BARIUM SULFATE 55 ML: 0.81 POWDER, FOR SUSPENSION ORAL at 15:20

## 2023-03-27 RX ADMIN — MULTIPLE VITAMINS W/ MINERALS TAB 1 TABLET: TAB at 09:53

## 2023-03-27 RX ADMIN — Medication 10 ML: at 21:04

## 2023-03-27 RX ADMIN — OXYBUTYNIN CHLORIDE 5 MG: 5 TABLET ORAL at 21:05

## 2023-03-27 RX ADMIN — TRAZODONE HYDROCHLORIDE 50 MG: 50 TABLET ORAL at 21:05

## 2023-03-27 RX ADMIN — LATANOPROST 1 DROP: 50 SOLUTION OPHTHALMIC at 21:05

## 2023-03-27 RX ADMIN — IPRATROPIUM BROMIDE AND ALBUTEROL SULFATE 3 ML: 2.5; .5 SOLUTION RESPIRATORY (INHALATION) at 13:42

## 2023-03-27 RX ADMIN — SODIUM CHLORIDE 1 G: 900 INJECTION INTRAVENOUS at 21:04

## 2023-03-27 RX ADMIN — BRIMONIDINE TARTRATE 1 DROP: 2 SOLUTION OPHTHALMIC at 09:50

## 2023-03-27 RX ADMIN — IPRATROPIUM BROMIDE AND ALBUTEROL SULFATE 3 ML: 2.5; .5 SOLUTION RESPIRATORY (INHALATION) at 19:11

## 2023-03-27 RX ADMIN — CARBAMAZEPINE 200 MG: 200 TABLET ORAL at 17:09

## 2023-03-27 RX ADMIN — TIMOLOL MALEATE 1 DROP: 5 SOLUTION/ DROPS OPHTHALMIC at 09:50

## 2023-03-27 NOTE — PROGRESS NOTES
[facility] Medicine Services  PROGRESS NOTE    Patient Name: Anderson Rey  : 1965  MRN: [id number]    Date of Admission: 3/24/2023  Primary Care Physician: Yelena Fuentes MD    Subjective     CC: f/u respiratory failure / pneumonia     HPI:  In bed. He is feeling well. Notes mild dyspnea and coughing. I was able to wean to 1L NC at bedside.     ROS:  Gen- No fevers, chills  CV- No chest pain, palpitations  Resp- as above   GI- No N/V/D, abd pain     Objective     Vital Signs:   Temp:  [98.1 °F (36.7 °C)-98.8 °F (37.1 °C)] 98.8 °F (37.1 °C)  Heart Rate:  [76-97] 77  Resp:  [16-20] 16  BP: (112-129)/(74-94) 120/77  Flow (L/min):  [1-2] 1     Physical Exam:  Constitutional: No acute distress, awake, alert and conversant. Laying in bed   HENT: NCAT, mucous membranes moist  Respiratory: Expiratory rhonchi, intermittent cough, normal respiratory effort on 1L NC    Cardiovascular: RRR, no murmurs, rubs, or gallops  Gastrointestinal: Positive bowel sounds, soft, nontender, nondistended  Musculoskeletal: No bilateral ankle edema. LUE contractures  Psychiatric: Appropriate affect, cooperative  Neurologic: Moves all extremities spontaneously without focal deficits, speech clear and coherent     Results Reviewed:  LAB RESULTS:      Lab 23  0529 23  04023  1841   WBC 6.89 10.26 10.84*   HEMOGLOBIN 12.5* 12.9* 15.2   HEMATOCRIT 37.4* 38.0 44.4   PLATELETS 222 191 211   NEUTROS ABS  --  7.10* 9.27*   IMMATURE GRANS (ABS)  --  0.05 0.03   LYMPHS ABS  --  1.86 0.67*   MONOS ABS  --  1.17* 0.84   EOS ABS  --  0.05 0.00   MCV 89.5 89.0 87.7   PROCALCITONIN  --   --  2.09*   LACTATE  --   --  1.2         Lab 23  0529 23  0402 23  1841   SODIUM 133* 130* 130*   POTASSIUM 4.0 3.8 3.7   CHLORIDE 98 96* 94*   CO2 27.0 27.0 25.0   ANION GAP 8.0 7.0 11.0   BUN 8 10 12   CREATININE 0.46* 0.53* 0.57*   EGFR 123.5 118.4 115.8   GLUCOSE 99 95 124*   CALCIUM 8.5*  8.0* 9.1   MAGNESIUM  --  1.7  --          Lab 03/24/23 1841   TOTAL PROTEIN 7.3   ALBUMIN 3.9   GLOBULIN 3.4   ALT (SGPT) 20   AST (SGOT) 20   BILIRUBIN 0.6   ALK PHOS 71         Lab 03/24/23 1841   PROBNP 36.7   HSTROP T 9     Brief Urine Lab Results  (Last result in the past 365 days)      Color   Clarity   Blood   Leuk Est   Nitrite   Protein   CREAT   Urine HCG        03/24/23 1927 Dark Yellow   Cloudy   Negative   Trace   Positive   100 mg/dL (2+)               Microbiology Results Abnormal     Procedure Component Value - Date/Time    Blood Culture - Blood, Hand, Right [[id number]]  (Normal) Collected: 03/24/23 1839    Lab Status: Preliminary result Specimen: Blood from Hand, Right Updated: 03/26/23 1931     Blood Culture No growth at 2 days    Blood Culture - Blood, Arm, Left [[id number]]  (Normal) Collected: 03/24/23 1623    Lab Status: Preliminary result Specimen: Blood from Arm, Left Updated: 03/26/23 1931     Blood Culture No growth at 2 days    COVID PRE-OP / PRE-PROCEDURE SCREENING ORDER (NO ISOLATION) - Swab, Nasopharynx [[id number]]  (Normal) Collected: 03/24/23 1853    Lab Status: Final result Specimen: Swab from Nasopharynx Updated: 03/24/23 1920    Narrative:      The following orders were created for panel order COVID PRE-OP / PRE-PROCEDURE SCREENING ORDER (NO ISOLATION) - Swab, Nasopharynx.  Procedure                               Abnormality         Status                     ---------                               -----------         ------                     COVID-19 and FLU A/B PCR...[[id number]]  Normal              Final result                 Please view results for these tests on the individual orders.    COVID-19 and FLU A/B PCR - Swab, Nasopharynx [[id number]]  (Normal) Collected: 03/24/23 1853    Lab Status: Final result Specimen: Swab from Nasopharynx Updated: 03/24/23 1920     COVID19 Not Detected     Influenza A PCR Not Detected     Influenza B PCR Not Detected    Narrative:      Fact  sheet for providers: https://www.fda.gov/media/161989/download    Fact sheet for patients: https://www.fda.gov/media/360693/download    Test performed by PCR.        No radiology results from the last 24 hrs    Current medications:  Scheduled Meds:atorvastatin, 20 mg, Oral, Nightly  brimonidine, 1 drop, Left Eye, BID   And  timolol, 1 drop, Left Eye, Daily  carBAMazepine, 200 mg, Oral, TID  cefTRIAXone, 1 g, Intravenous, Q24H  doxycycline, 100 mg, Oral, Q12H  guaiFENesin, 1,200 mg, Oral, Q12H  ipratropium-albuterol, 3 mL, Nebulization, 4x Daily - RT  latanoprost, 1 drop, Left Eye, Nightly  multivitamin with minerals, 1 tablet, Oral, QAM  oxybutynin, 5 mg, Oral, TID  sodium chloride, 10 mL, Intravenous, Q12H  traZODone, 50 mg, Oral, Nightly      Continuous Infusions:   PRN Meds:.•  acetaminophen **OR** acetaminophen **OR** acetaminophen  •  benzonatate  •  ondansetron **OR** ondansetron  •  sodium chloride  •  sodium chloride  •  sodium chloride    Assessment & Plan     Active Hospital Problems    Diagnosis  POA   • **Sepsis ([facility]) [A41.9]  Unknown   • Pneumonia of left lower lobe due to infectious organism [J18.9]  Yes   • Acute UTI (urinary tract infection) [N39.0]  Unknown   • Hyponatremia [E87.1]  Unknown   • TBI (traumatic brain injury) ([facility]) [S06.9XAA]  Unknown   • Familial hypercholesterolemia [E78.01]  Yes   • QUEENIE (generalized anxiety disorder) [F41.1]  Yes   • Hypoxemia [R09.02]  Yes   • Seizure disorder ([facility]) [G40.909]  Yes   • OAB (overactive bladder) [N32.81]  Yes      Resolved Hospital Problems   No resolved problems to display.     Brief Hospital Course to date:  Anderson Rey is a 57 y.o. male with PMH significant for prior TBI with chronic physical/mental deficits, seizure disorder, HLD, overactive and anxiety. He resides in a group home. He presented to [facility] ED on 3/24/2023 for evaluation of cough, fever and anorexia. He was borderline hypoxic in ED and imaging consistent with  pneumonia.     LLL lingular pneumonia with hypoxia   - CTA w/o evidence of PE but consistent with LLL/lingula airspace disease  - Continue IV Rocephin / Doxycycline - plan to transition to PO at DC  - Continue Mucinex and OPEP  - Wean O2 as able. Will need to be off of O2 in order to return to group home. I weaned to 1L NC today  - SLP following - barium swallow planned     Bacteruria  - Abnormal UA but no urinary symptoms  - Urine culture growing 50,000 CFU areococcus urinae     HLD  - Continue statin    Mild hyponatremia, likely not clinically significant  - Appears chronic (limited prior data but present in 2017)  - Suspect related to prior TBI and seizure meds  - Sodium stable    Glaucoma  - Continue home eye drops    TBI  Seizure disorder  - Requires increased care  - Continue home carbamazepine, trazodone    Expected Discharge Location and Transportation: back to group home   Expected Discharge   Expected Discharge Date and Time     Expected Discharge Date Expected Discharge Time    Mar 29, 2023          DVT prophylaxis:Mechanical DVT prophylaxis orders are present.     AM-PAC 6 Clicks Score (PT): 12 (03/27/23 0830)    CODE STATUS:   Code Status and Medical Interventions:   Ordered at: 03/24/23 2116     Level Of Support Discussed With:    Patient     Code Status (Patient has no pulse and is not breathing):    CPR (Attempt to Resuscitate)     Medical Interventions (Patient has pulse or is breathing):    Full Support     Marilee Daigle PA-C  03/27/23

## 2023-03-27 NOTE — THERAPY EVALUATION
Acute Care - Speech Language Pathology   Swallow Initial Evaluation [facility]   Clinical Swallow Evaluation       Patient Name: Anderson Rey  : 1965  MRN: [id number]  Today's Date: 3/27/2023               Admit Date: 3/24/2023    Visit Dx:     ICD-10-CM ICD-9-CM   1. Pneumonia of left lower lobe due to infectious organism  J18.9 486   2. History of cerebral palsy  Z86.69 V12.49   3. Sepsis, due to unspecified organism, unspecified whether acute organ dysfunction present (HCC)  A41.9 038.9     995.91   4. Urinary tract infection without hematuria, site unspecified  N39.0 599.0   5. Dysphagia, unspecified type  R13.10 787.20     Patient Active Problem List   Diagnosis   • Hypoxemia   • Familial hypercholesterolemia   • Glaucoma   • QUEENIE (generalized anxiety disorder)   • Seizure disorder (HCC)   • OAB (overactive bladder)   • Pneumonia of right lower lobe due to infectious organism   • Acute bronchitis   • Pneumonia of left lower lobe due to infectious organism   • Acute UTI (urinary tract infection)   • Hyponatremia   • TBI (traumatic brain injury) (HCC)   • Sepsis ([facility])     Past Medical History:   Diagnosis Date   • Cerebral palsy, hemiplegic ([facility])    • QUEENIE (generalized anxiety disorder)    • Glaucoma    • HLD (hyperlipidemia)    • OAB (overactive bladder)    • Post-traumatic brain syndrome    • Seizure disorder ([facility])    • Vitamin D deficiency      Past Surgical History:   Procedure Laterality Date   • EXPLORATORY LAPAROTOMY     • PEG TUBE REMOVAL     • TRACHEOSTOMY CLOSURE/STOMA REVISION         SLP Recommendation and Plan  SLP Swallowing Diagnosis: suspected pharyngeal dysphagia (23 0850)  SLP Diet Recommendation: regular textures, no mixed consistencies, nectar thick liquids (23 0850)  Recommended Precautions and Strategies: upright posture during/after eating, small bites of food and sips of liquid, general aspiration precautions (23 0850)  SLP Rec. for Method of  Medication Administration: meds whole, meds crushed, with puree, as tolerated (03/27/23 0850)     Monitor for Signs of Aspiration: yes, notify SLP if any concerns (03/27/23 0850)  Recommended Diagnostics: reassess via VFSS ([facility]) (03/27/23 0850)  Swallow Criteria for Skilled Therapeutic Interventions Met: demonstrates skilled criteria (03/27/23 0850)  Anticipated Discharge Disposition (SLP): anticipate therapy at next level of care, inpatient rehabilitation facility (03/27/23 0850)  Rehab Potential/Prognosis, Swallowing: good, to achieve stated therapy goals (03/27/23 0850)     Predicted Duration Therapy Intervention (Days): until discharge (03/27/23 0850)                                               SWALLOW EVALUATION (last 72 hours)     SLP Adult Swallow Evaluation     Row Name 03/27/23 0850       Rehab Evaluation    Document Type evaluation  -CJ    Subjective Information no complaints  -CJ    Patient Observations alert;cooperative  -CJ    Patient/Family/Caregiver Comments/Observations no family present  -CJ    Patient Effort good  -CJ    Symptoms Noted During/After Treatment none  -CJ       General Information    Patient Profile Reviewed yes  -CJ    Pertinent History Of Current Problem adm w/ sepsis; sig h/o QUEENIE, seizures, PNA of LLL, TBI, bronchitis  -CJ    Current Method of Nutrition regular textures;thin liquids  -CJ    Precautions/Limitations, Vision WFL;for purposes of eval  -CJ    Precautions/Limitations, Hearing WFL;for purposes of eval  -CJ    Prior Level of Function-Communication cognitive-linguistic impairment;other (see comments)  TBI  -CJ    Prior Level of Function-Swallowing unknown  -CJ    Plans/Goals Discussed with patient  -CJ    Barriers to Rehab medically complex;cognitive status  -CJ    Patient's Goals for Discharge patient did not state  -CJ       Pain    Additional Documentation Pain Scale: FACES Pre/Post-Treatment (Group)  -CJ       Pain Scale: FACES Pre/Post-Treatment    Pain: FACES Scale,  Pretreatment 0-->no hurt  -    Posttreatment Pain Rating 0-->no hurt  -       Oral Motor Structure and Function    Dentition Assessment natural, present and adequate  -    Secretion Management WNL/WFL  -    Mucosal Quality moist, healthy  -       Oral Musculature and Cranial Nerve Assessment    Oral Motor General Assessment WFL  -       General Eating/Swallowing Observations    Respiratory Support Currently in Use nasal cannula  -    O2 Liters 2L  -    Eating/Swallowing Skills fed by SLP  -    Positioning During Eating upright in bed  -    Utensils Used spoon;cup;straw  -    Consistencies Trialed regular textures;pureed;ice chips;thin liquids;nectar/syrup-thick liquids  -       Clinical Swallow Eval    Pharyngeal Phase suspected pharyngeal impairment  -    Clinical Swallow Evaluation Summary Oral phase is generally weak. Overt s/s of aspiration w/ trials of thin liquids via cup/straw. No overt s/s of aspiration w/ puree, solids or nectar thick liquids. Will modify po diet to regular/nectar thick, no mixed until MBS today  -       Pharyngeal Phase Concerns    Pharyngeal Phase Concerns cough  -    Cough thin  -       SLP Evaluation Clinical Impression    SLP Swallowing Diagnosis suspected pharyngeal dysphagia  -    Functional Impact risk of aspiration/pneumonia  -    Rehab Potential/Prognosis, Swallowing good, to achieve stated therapy goals  -    Swallow Criteria for Skilled Therapeutic Interventions Met demonstrates skilled criteria  -       Recommendations    Predicted Duration Therapy Intervention (Days) until discharge  -    SLP Diet Recommendation regular textures;no mixed consistencies;nectar thick liquids  -    Recommended Diagnostics reassess via VFSS ([facility])  -    Recommended Precautions and Strategies upright posture during/after eating;small bites of food and sips of liquid;general aspiration precautions  -    Oral Care Recommendations Oral Care BID/PRN;Suction  toothbrush  -    SLP Rec. for Method of Medication Administration meds whole;meds crushed;with puree;as tolerated  -    Monitor for Signs of Aspiration yes;notify SLP if any concerns  -    Anticipated Discharge Disposition (SLP) anticipate therapy at next level of care;inpatient rehabilitation facility  -          User Key  (r) = Recorded By, (t) = Taken By, (c) = Cosigned By    Initials Name Effective Dates    Shivani Wolfe MS CCC-SLP 06/16/21 -                 EDUCATION  The patient has been educated in the following areas:   Dysphagia (Swallowing Impairment) Oral Care/Hydration.              Time Calculation:    Time Calculation- SLP     Row Name 03/27/23 1101             Time Calculation- SLP    SLP Start Time 0850  -      SLP Received On 03/27/23  -         Untimed Charges    [id number] Eval Oral Pharyng Swallow Minutes 40  -         Total Minutes    Untimed Charges Total Minutes 40  -       Total Minutes 40  -            User Key  (r) = Recorded By, (t) = Taken By, (c) = Cosigned By    Initials Name Provider Type    Shivani Wolfe MS CCC-SLP Speech and Language Pathologist                Therapy Charges for Today     Code Description Service Date Service Provider Modifiers Qty    [id number] HC ST EVAL ORAL PHARYNG SWALLOW 3 3/27/2023 Shivani Avitia MS CCC-SLP GN 1               Shivani Avitia MS CCC-ADOLPH  3/27/2023

## 2023-03-27 NOTE — PLAN OF CARE
Goal Outcome Evaluation:  Plan of Care Reviewed With: patient        Progress: no change      Pt is alert and oriented to self; confused. VSS on room air to 2L NC. NSR on tele. Pt has slept the majority of the shift. No c/o of pain. PRN tessalon given as ordered. Waffle mattress and SCDS in place. Turned q2hrs. Urinal provided and voiding spontaneously. Will continue to monitor.

## 2023-03-27 NOTE — MBS/VFSS/FEES
Acute Care - Speech Language Pathology   Swallow Initial Evaluation [facility]   Modified Barium Swallow Study (MBS)       Patient Name: Anderson Rey  : 1965  MRN: [id number]  Today's Date: 3/27/2023               Admit Date: 3/24/2023    Visit Dx:     ICD-10-CM ICD-9-CM   1. Pneumonia of left lower lobe due to infectious organism  J18.9 486   2. History of cerebral palsy  Z86.69 V12.49   3. Sepsis, due to unspecified organism, unspecified whether acute organ dysfunction present (HCC)  A41.9 038.9     995.91   4. Urinary tract infection without hematuria, site unspecified  N39.0 599.0   5. Dysphagia, unspecified type  R13.10 787.20     Patient Active Problem List   Diagnosis   • Hypoxemia   • Familial hypercholesterolemia   • Glaucoma   • QUEENIE (generalized anxiety disorder)   • Seizure disorder (HCC)   • OAB (overactive bladder)   • Pneumonia of right lower lobe due to infectious organism   • Acute bronchitis   • Pneumonia of left lower lobe due to infectious organism   • Acute UTI (urinary tract infection)   • Hyponatremia   • TBI (traumatic brain injury) (HCC)   • Sepsis (HCC)     Past Medical History:   Diagnosis Date   • Cerebral palsy, hemiplegic (HCC)    • QUEENIE (generalized anxiety disorder)    • Glaucoma    • HLD (hyperlipidemia)    • OAB (overactive bladder)    • Post-traumatic brain syndrome    • Seizure disorder (HCC)    • Vitamin D deficiency      Past Surgical History:   Procedure Laterality Date   • EXPLORATORY LAPAROTOMY     • PEG TUBE REMOVAL     • TRACHEOSTOMY CLOSURE/STOMA REVISION         SLP Recommendation and Plan  SLP Swallowing Diagnosis: functional oral phase, swallow WFL/no suspected pharyngeal impairment (23 1505)  SLP Diet Recommendation: regular textures, thin liquids (23 1505)  Recommended Precautions and Strategies: upright posture during/after eating, small bites of food and sips of liquid, general aspiration precautions (23 1505)  SLP Rec. for  Method of Medication Administration: meds whole, meds crushed, with puree, as tolerated (03/27/23 1505)     Monitor for Signs of Aspiration: yes, notify SLP if any concerns (03/27/23 1505)  Recommended Diagnostics: other (see comments) (diet tolerance) (03/27/23 1505)  Swallow Criteria for Skilled Therapeutic Interventions Met: demonstrates skilled criteria (03/27/23 1505)  Anticipated Discharge Disposition (SLP): anticipate therapy at next level of care, inpatient rehabilitation facility (03/27/23 1505)  Rehab Potential/Prognosis, Swallowing: good, to achieve stated therapy goals (03/27/23 1505)  Therapy Frequency (Swallow): PRN (03/27/23 1505)  Predicted Duration Therapy Intervention (Days): until discharge (03/27/23 1505)                                               SWALLOW EVALUATION (last 72 hours)     SLP Adult Swallow Evaluation     Row Name 03/27/23 1505 03/27/23 0850                Rehab Evaluation    Document Type evaluation  - evaluation  -       Subjective Information no complaints  - no complaints  -       Patient Observations alert;cooperative  - alert;cooperative  -       Patient/Family/Caregiver Comments/Observations No family present  - no family present  -       Patient Effort good  - good  -       Symptoms Noted During/After Treatment none  - none  -          General Information    Patient Profile Reviewed yes  - yes  -       Pertinent History Of Current Problem See previous eval, pt referred for MBS. Completed in conjunction w/ CJ  - adm w/ sepsis; sig h/o QUEENIE, seizures, PNA of LLL, TBI, bronchitis  -       Current Method of Nutrition regular textures;nectar/syrup-thick liquids  - regular textures;thin liquids  -       Precautions/Limitations, Vision WFL;for purposes of eval  - WFL;for purposes of eval  -CJ       Precautions/Limitations, Hearing WFL;for purposes of eval  - WFL;for purposes of eval  -CJ       Prior Level of Function-Communication  cognitive-linguistic impairment;other (see comments)  TBI  - cognitive-linguistic impairment;other (see comments)  TBI  -CJ       Prior Level of Function-Swallowing unknown  - unknown  -       Plans/Goals Discussed with patient  - patient  -       Barriers to Rehab medically complex;cognitive status  - medically complex;cognitive status  -       Patient's Goals for Discharge patient did not state  - patient did not state  -          Pain    Additional Documentation Pain Scale: FACES Pre/Post-Treatment (Group)  - Pain Scale: FACES Pre/Post-Treatment (Group)  -          Pain Scale: FACES Pre/Post-Treatment    Pain: FACES Scale, Pretreatment 0-->no hurt  - 0-->no hurt  -CJ       Posttreatment Pain Rating 0-->no hurt  - 0-->no hurt  -          Oral Motor Structure and Function    Dentition Assessment -- natural, present and adequate  -       Secretion Management -- WNL/WFL  -       Mucosal Quality -- moist, healthy  -          Oral Musculature and Cranial Nerve Assessment    Oral Motor General Assessment -- WFL  -          General Eating/Swallowing Observations    Respiratory Support Currently in Use -- nasal cannula  -       O2 Liters -- 2L  -       Eating/Swallowing Skills -- fed by SLP  -       Positioning During Eating -- upright in bed  -       Utensils Used -- spoon;cup;straw  -       Consistencies Trialed -- regular textures;pureed;ice chips;thin liquids;nectar/syrup-thick liquids  -          Clinical Swallow Eval    Pharyngeal Phase -- suspected pharyngeal impairment  -       Clinical Swallow Evaluation Summary -- Oral phase is generally weak. Overt s/s of aspiration w/ trials of thin liquids via cup/straw. No overt s/s of aspiration w/ puree, solids or nectar thick liquids. Will modify po diet to regular/nectar thick, no mixed until MBS today  -          Pharyngeal Phase Concerns    Pharyngeal Phase Concerns -- cough  -       Cough -- thin  -           MBS/VFSS    Utensils Used spoon;cup;straw  -MH --       Consistencies Trialed thin liquids;pudding thick;regular textures  - --          MBS/VFSS Interpretation    Oral Prep Phase WFL  -MH --       Oral Transit Phase WFL  -MH --       Oral Residue WFL  -MH --       VFSS Summary Grossly functional oral and pharyngeal phases of swallow. Prespill w/ thins to the pyriforms. High/transient penetration w/ thins occuring during the swallow, material noted to fully clear upon completion of the swallow. No further events of laryngeal penetration or aspiration observed accross study. Pt ok to initiate regular diet w/ thin liquids, general aspiration precautions. SLP will f/u for diet tolerace/education.  -MH --          Initiation of Pharyngeal Swallow    Initiation of Pharyngeal Swallow bolus in pyriform sinuses  -MH --       Pharyngeal Phase functional pharyngeal phase of swallowing  -MH --       Penetration During the Swallow thin liquids;secondary to reduced vestibular closure;secondary to reduced laryngeal elevation;secondary to delayed swallow initiation or mistiming  -MH --       Depth of Penetration shallow;transient  - --       Response to Penetration Yes  - --       Responsed to penetration with other (see comments)  fully cleared upon completion of swallow  - --       Rosenbek's Scale thin:;2--->level 2;pudding/puree:;regular textures:;1--->level 1  -MH --       Pharyngeal Residue pyriform sinuses;posterior pharyngeal wall;laryngeal vestibule;secondary to reduced hyolaryngeal excursion;secondary to reduced laryngeal elevation;secondary to reduced posterior pharyngeal wall stripping  - --       Response to Residue cleared residue with cued swallow;cleared residue with liquid wash  - --       Attempted Compensatory Maneuvers bolus size;bolus presentation style;additional subsequent swallow  - --       Response to Attempted Compensatory Maneuvers did not prevent penetration;reduced residue  - --        Successful Compensatory Maneuver Competency patient able to;demonstrate compensations;with cues  - --          SLP Evaluation Clinical Impression    SLP Swallowing Diagnosis functional oral phase;swallow WFL/no suspected pharyngeal impairment  - suspected pharyngeal dysphagia  -       Functional Impact no impact on function  - risk of aspiration/pneumonia  -       Rehab Potential/Prognosis, Swallowing good, to achieve stated therapy goals  - good, to achieve stated therapy goals  -       Swallow Criteria for Skilled Therapeutic Interventions Met demonstrates skilled criteria  - demonstrates skilled criteria  -          Recommendations    Therapy Frequency (Swallow) PRN  - --       Predicted Duration Therapy Intervention (Days) until discharge  - until discharge  -       SLP Diet Recommendation regular textures;thin liquids  - regular textures;no mixed consistencies;nectar thick liquids  -       Recommended Diagnostics other (see comments)  diet tolerance  - reassess via VFSS ([facility])  -       Recommended Precautions and Strategies upright posture during/after eating;small bites of food and sips of liquid;general aspiration precautions  - upright posture during/after eating;small bites of food and sips of liquid;general aspiration precautions  -       Oral Care Recommendations Oral Care BID/PRN;Suction toothbrush  - Oral Care BID/PRN;Suction toothbrush  -       SLP Rec. for Method of Medication Administration meds whole;meds crushed;with puree;as tolerated  - meds whole;meds crushed;with puree;as tolerated  -       Monitor for Signs of Aspiration yes;notify SLP if any concerns  - yes;notify SLP if any concerns  -       Anticipated Discharge Disposition (SLP) anticipate therapy at next level of care;inpatient rehabilitation facility  - anticipate therapy at next level of care;inpatient rehabilitation facility  -             User Key  (r) = Recorded By, (t) = Taken By,  (c) = Cosigned By    Initials Name Effective Dates    Shivani Wolfe MS CCC-SLP 06/16/21 -      Karli Herman MS, BOSSMAN-SLP 06/22/22 -                 EDUCATION  The patient has been educated in the following areas:   Dysphagia (Swallowing Impairment).        SLP GOALS     Row Name 03/27/23 1505             (LTG) Patient will demonstrate functional swallow for    Diet Texture (Demonstrate functional swallow) regular textures  -      Liquid viscosity (Demonstrate functional swallow) thin liquids  -      Davis (Demonstrate functional swallow) independently (over 90% accuracy)  -      Time Frame (Demonstrate functional swallow) by discharge  -      Progress/Outcomes (Demonstrate functional swallow) new goal  -         (STG) Patient will tolerate trials of    Consistencies Trialed (Tolerate trials) regular textures;thin liquids  -      Desired Outcome (Tolerate trials) without signs/symptoms of aspiration  -      Davis (Tolerate trials) independently (over 90% accuracy)  -      Time Frame (Tolerate trials) 1 week  -      Progress/Outcomes (Tolerate trials) new goal  -            User Key  (r) = Recorded By, (t) = Taken By, (c) = Cosigned By    Initials Name Provider Type    Karli James, MS, BOSSMAN-SLP Speech and Language Pathologist                   Time Calculation:    Time Calculation- SLP     Row Name 03/27/23 1614 03/27/23 1101          Time Calculation- [facility]    SLP Start Time 1505  - 0850  -     SLP Received On 03/27/23  - 03/27/23  -        Untimed Charges    10670-NF Eval Oral Pharyng Swallow Minutes -- 40  -     66885-EX Motion Fluoro Eval Swallow Minutes 81  -MH --        Total Minutes    Untimed Charges Total Minutes 81  - 40  -CJ      Total Minutes 81  - 40  -           User Key  (r) = Recorded By, (t) = Taken By, (c) = Cosigned By    Initials Name Provider Type    Shivani Wolfe MS CCC-SLP Speech and Language Pathologist    MALDONADO Herman  Karli CAMACHO MS, CFY-SLP Speech and Language Pathologist                Therapy Charges for Today     Code Description Service Date Service Provider Modifiers Qty    08343781860 HC ST MOTION FLUORO EVAL SWALLOW 5 3/27/2023 Karli Herman, MS, CFY-SLP GN 1               Karli Herman MS, NAVEEDY-SLP  3/27/2023

## 2023-03-27 NOTE — PLAN OF CARE
Goal Outcome Evaluation:   SLP evaluation completed. Will continue to address dysphagia. Please see note for further details and recommendations.

## 2023-03-27 NOTE — CASE MANAGEMENT/SOCIAL WORK
Discharge Planning Assessment  [facility]     Patient Name: Anderson Rey  MRN: [id number]  Today's Date: 3/27/2023    Admit Date: 3/24/2023    Plan: Home   Discharge Needs Assessment     Row Name 03/27/23 1247       Living Environment    People in Home other (see comments)    Unique Family Situation Resides in a group [facility]    Current Living Arrangements home    Primary Care Provided by other (see comments)    Provides Primary Care For no one    Family Caregiver if Needed other (see comments)  staff at [facility] available 24/7    Quality of Family Relationships helpful;involved;supportive    Able to Return to Prior Arrangements yes       Resource/Environmental Concerns    Resource/Environmental Concerns none       Food Insecurity    Within the past 12 months, you worried that your food would run out before you got the money to buy more. Never true    Within the past 12 months, the food you bought just didn't last and you didn't have money to get more. Never true       Transition Planning    Patient/Family Anticipates Transition to other (see comments)    Patient/Family Anticipated Services at Transition none    Transportation Anticipated other (see comments)  staff at [facility] will provide transportation       Discharge Needs Assessment    Readmission Within the Last 30 Days no previous admission in last 30 days    Current Outpatient/Agency/Support Group group home;adult day care    Equipment Currently Used at Home wheelchair    Concerns to be Addressed basic needs;discharge planning    Anticipated Changes Related to Illness none    Equipment Needed After Discharge none    Outpatient/Agency/Support Group Needs group home    Provided Post Acute Provider List? N/A    Current Discharge Risk dependent with mobility/activities of daily living;cognitively impaired               Discharge Plan     Row Name 03/27/23 1249       Plan    Plan Home    Plan Comments Chart reviewed. I met with Mr Rey and a caregiver at  bedside. He resides in a group home operated by [facility]. His caregiver states he is bed to wheelchair only, he is not ambulatory. He has caregivers 24/7 in his group home setting. He does not use home O2. I spoke with Hafsa at [phone], she is  of his home and confirmed he can return when medically ready. Their staff will transport back in a car. Case mgt will follow    Final Discharge Disposition Code 01 - home or self-care              Continued Care and Services - Admitted Since 3/24/2023    Coordination has not been started for this encounter.       Expected Discharge Date and Time     Expected Discharge Date Expected Discharge Time    Mar 31, 2023          Demographic Summary     Row Name 03/27/23 1240       General Information    Admission Type inpatient    Arrived From home    Referral Source admission list    Reason for Consult discharge planning    Preferred Language English    General Information Comments PCp- Yelena Barron       Contact Information    Permission Granted to Share Info With     Contact Information Comments Hafsa Ashley (director of program and group home) [phone]               Functional Status     Row Name 03/27/23 1240       Functional Status    Usual Activity Tolerance moderate    Current Activity Tolerance --  see PT/OT evals       Physical Activity    On average, how many days per week do you engage in moderate to strenuous exercise (like a brisk walk)? 0 days    On average, how many minutes do you engage in exercise at this level? 0 min    Number of minutes of exercise per week 0       Assessment of Health Literacy    How often do you have someone help you read hospital materials? Always    How often do you have problems learning about your medical condition because of difficulty understanding written information? Always    How often do you have a problem understanding what is told to you about your medical condition? Always    How  confident are you filling out medical forms by yourself? Not at all       Functional Status, IADL    Medications assistive person    Meal Preparation assistive person    Housekeeping assistive person    Laundry assistive person    Shopping assistive person       Mental Status    General Appearance WDL WDL       Mental Status Summary    Recent Changes in Mental Status/Cognitive Functioning unable to assess       Employment/    Employment Status disabled    Employment/ Comments insurance- Medicare and Medicaid               Psychosocial    No documentation.                Abuse/Neglect    No documentation.                Legal    No documentation.                Substance Abuse    No documentation.                Patient Forms    No documentation.                   Sonja C Kellerman, RN

## 2023-03-27 NOTE — DISCHARGE INSTR - DIET
MBS/VFSS Reason for Referral  Patient was referred for a MBS to assess the efficiency of his/her swallow function, rule out aspiration and make recommendations regarding safe dietary consistencies, effective compensatory strategies, and safe eating environment.             Recommendations/Treatment  SLP Swallowing Diagnosis: functional oral phase, swallow WFL/no suspected pharyngeal impairment (03/27/23 1505)  Functional Impact: no impact on function (03/27/23 1505)  Rehab Potential/Prognosis, Swallowing: good, to achieve stated therapy goals (03/27/23 1505)  Swallow Criteria for Skilled Therapeutic Interventions Met: demonstrates skilled criteria (03/27/23 1505)  Therapy Frequency (Swallow): PRN (03/27/23 1505)  Predicted Duration Therapy Intervention (Days): until discharge (03/27/23 1505)  SLP Diet Recommendation: regular textures, no mixed consistencies, thin liquids (03/27/23 1505)  Recommended Diagnostics: other (see comments) (diet tolerance) (03/27/23 1505)  Recommended Precautions and Strategies: upright posture during/after eating, small bites of food and sips of liquid, general aspiration precautions (03/27/23 1505)  SLP Rec. for Method of Medication Administration: meds whole, meds crushed, with puree, as tolerated (03/27/23 1505)  Monitor for Signs of Aspiration: yes, notify SLP if any concerns (03/27/23 1505)  Anticipated Discharge Disposition (SLP): anticipate therapy at next level of care, inpatient rehabilitation facility (03/27/23 1505)    Instrumental Set-up  Utensils Used: spoon, cup, straw (03/27/23 1505)  Consistencies Trialed: thin liquids, pudding thick, regular textures (03/27/23 1505)    Oral Preparation/ Oral Phase  Oral Prep Phase: WFL (03/27/23 1505)  Oral Transit Phase: WFL (03/27/23 1505)  Oral Residue: WFL (03/27/23 1505)             Pharyngeal Phase  Initiation of Pharyngeal Swallow: bolus in pyriform sinuses (03/27/23 1505)  Pharyngeal Phase: functional pharyngeal phase of  swallowing (03/27/23 1505)  Penetration During the Swallow: thin liquids, secondary to reduced vestibular closure, secondary to reduced laryngeal elevation, secondary to delayed swallow initiation or mistiming (03/27/23 1505)  Pharyngeal Residue: pyriform sinuses, posterior pharyngeal wall, laryngeal vestibule, secondary to reduced hyolaryngeal excursion, secondary to reduced laryngeal elevation, secondary to reduced posterior pharyngeal wall stripping (03/27/23 1505)  Response to Residue: cleared residue with cued swallow, cleared residue with liquid wash (03/27/23 1505)  Attempted Compensatory Maneuvers: bolus size, bolus presentation style, additional subsequent swallow (03/27/23 1505)  Response to Attempted Compensatory Maneuvers: did not prevent penetration, reduced residue (03/27/23 1505)  VFSS Summary: Grossly functional oral and pharyngeal phases of swallow. Prespill w/ thins to the pyriforms. High/transient penetration w/ thins occuring during the swallow, material noted to fully clear upon completion of the swallow. No further events of laryngeal penetration or aspiration observed accross study. Pt ok to initiate regular diet w/ thin liquids, general aspiration precautions. SLP will f/u for diet tolerace/education. (03/27/23 1505)    Cervical Esophageal Phase

## 2023-03-28 PROCEDURE — 94799 UNLISTED PULMONARY SVC/PX: CPT

## 2023-03-28 PROCEDURE — 99231 SBSQ HOSP IP/OBS SF/LOW 25: CPT | Performed by: PHYSICIAN ASSISTANT

## 2023-03-28 PROCEDURE — 97530 THERAPEUTIC ACTIVITIES: CPT

## 2023-03-28 PROCEDURE — 97110 THERAPEUTIC EXERCISES: CPT

## 2023-03-28 PROCEDURE — 25010000002 CEFTRIAXONE PER 250 MG

## 2023-03-28 PROCEDURE — 94761 N-INVAS EAR/PLS OXIMETRY MLT: CPT

## 2023-03-28 RX ORDER — AMOXICILLIN 250 MG
2 CAPSULE ORAL 2 TIMES DAILY
Status: DISCONTINUED | OUTPATIENT
Start: 2023-03-28 | End: 2023-03-29 | Stop reason: HOSPADM

## 2023-03-28 RX ORDER — BISACODYL 5 MG/1
10 TABLET, DELAYED RELEASE ORAL ONCE
Status: COMPLETED | OUTPATIENT
Start: 2023-03-28 | End: 2023-03-28

## 2023-03-28 RX ORDER — BISACODYL 10 MG
10 SUPPOSITORY, RECTAL RECTAL ONCE
Status: COMPLETED | OUTPATIENT
Start: 2023-03-28 | End: 2023-03-28

## 2023-03-28 RX ADMIN — Medication 10 ML: at 21:47

## 2023-03-28 RX ADMIN — LATANOPROST 1 DROP: 50 SOLUTION OPHTHALMIC at 21:47

## 2023-03-28 RX ADMIN — DOXYCYCLINE 100 MG: 100 CAPSULE ORAL at 21:47

## 2023-03-28 RX ADMIN — CARBAMAZEPINE 200 MG: 200 TABLET ORAL at 15:22

## 2023-03-28 RX ADMIN — IPRATROPIUM BROMIDE AND ALBUTEROL SULFATE 3 ML: 2.5; .5 SOLUTION RESPIRATORY (INHALATION) at 20:26

## 2023-03-28 RX ADMIN — Medication 10 ML: at 09:51

## 2023-03-28 RX ADMIN — BRIMONIDINE TARTRATE 1 DROP: 2 SOLUTION OPHTHALMIC at 09:51

## 2023-03-28 RX ADMIN — CARBAMAZEPINE 200 MG: 200 TABLET ORAL at 09:51

## 2023-03-28 RX ADMIN — IPRATROPIUM BROMIDE AND ALBUTEROL SULFATE 3 ML: 2.5; .5 SOLUTION RESPIRATORY (INHALATION) at 06:55

## 2023-03-28 RX ADMIN — BISACODYL 10 MG: 5 TABLET, COATED ORAL at 15:22

## 2023-03-28 RX ADMIN — ATORVASTATIN CALCIUM 20 MG: 20 TABLET, FILM COATED ORAL at 21:47

## 2023-03-28 RX ADMIN — Medication 10 MG: at 18:59

## 2023-03-28 RX ADMIN — IPRATROPIUM BROMIDE AND ALBUTEROL SULFATE 3 ML: 2.5; .5 SOLUTION RESPIRATORY (INHALATION) at 10:53

## 2023-03-28 RX ADMIN — CARBAMAZEPINE 200 MG: 200 TABLET ORAL at 21:47

## 2023-03-28 RX ADMIN — OXYBUTYNIN CHLORIDE 5 MG: 5 TABLET ORAL at 15:22

## 2023-03-28 RX ADMIN — MULTIPLE VITAMINS W/ MINERALS TAB 1 TABLET: TAB at 09:51

## 2023-03-28 RX ADMIN — SODIUM CHLORIDE 1 G: 900 INJECTION INTRAVENOUS at 21:48

## 2023-03-28 RX ADMIN — DOXYCYCLINE 100 MG: 100 CAPSULE ORAL at 09:51

## 2023-03-28 RX ADMIN — BRIMONIDINE TARTRATE 1 DROP: 2 SOLUTION OPHTHALMIC at 21:48

## 2023-03-28 RX ADMIN — GUAIFENESIN 1200 MG: 600 TABLET, EXTENDED RELEASE ORAL at 21:47

## 2023-03-28 RX ADMIN — SENNOSIDES AND DOCUSATE SODIUM 2 TABLET: 8.6; 5 TABLET ORAL at 21:47

## 2023-03-28 RX ADMIN — IPRATROPIUM BROMIDE AND ALBUTEROL SULFATE 3 ML: 2.5; .5 SOLUTION RESPIRATORY (INHALATION) at 15:52

## 2023-03-28 RX ADMIN — TIMOLOL MALEATE 1 DROP: 5 SOLUTION/ DROPS OPHTHALMIC at 09:51

## 2023-03-28 RX ADMIN — OXYBUTYNIN CHLORIDE 5 MG: 5 TABLET ORAL at 09:51

## 2023-03-28 RX ADMIN — OXYBUTYNIN CHLORIDE 5 MG: 5 TABLET ORAL at 21:47

## 2023-03-28 RX ADMIN — MAGNESIUM HYDROXIDE 10 ML: 2400 SUSPENSION ORAL at 15:22

## 2023-03-28 RX ADMIN — GUAIFENESIN 1200 MG: 600 TABLET, EXTENDED RELEASE ORAL at 09:51

## 2023-03-28 RX ADMIN — TRAZODONE HYDROCHLORIDE 50 MG: 50 TABLET ORAL at 21:47

## 2023-03-28 NOTE — PLAN OF CARE
Goal Outcome Evaluation:  Plan of Care Reviewed With: patient        Progress: no change   Pt is alert and oriented to self; confused. VSS on 1L NC. NSR on tele. Pt has slept off and throughout the shift. No c/o of pain. Waffle mattress in place and turned q2hrs. Urinal provided and voiding spontaneously. Will return back to group home at d/c when medically ready. Will continue to monitor.

## 2023-03-28 NOTE — THERAPY TREATMENT NOTE
Patient Name: Anderson Rey  : 1965    MRN: [id number]                              Today's Date: 3/28/2023       Admit Date: 3/24/2023    Visit Dx:     ICD-10-CM ICD-9-CM   1. Pneumonia of left lower lobe due to infectious organism  J18.9 486   2. History of cerebral palsy  Z86.69 V12.49   3. Sepsis, due to unspecified organism, unspecified whether acute organ dysfunction present (Formerly [facility])  A41.9 038.9     995.91   4. Urinary tract infection without hematuria, site unspecified  N39.0 599.0   5. Dysphagia, unspecified type  R13.10 787.20     Patient Active Problem List   Diagnosis   • Hypoxemia   • Familial hypercholesterolemia   • Glaucoma   • QUEENIE (generalized anxiety disorder)   • Seizure disorder ([facility])   • OAB (overactive bladder)   • Pneumonia of right lower lobe due to infectious organism   • Acute bronchitis   • Pneumonia of left lower lobe due to infectious organism   • Acute UTI (urinary tract infection)   • Hyponatremia   • TBI (traumatic brain injury) ([facility])   • Sepsis ([facility])     Past Medical History:   Diagnosis Date   • Cerebral palsy, hemiplegic ([facility])    • QUEENIE (generalized anxiety disorder)    • Glaucoma    • HLD (hyperlipidemia)    • OAB (overactive bladder)    • Post-traumatic brain syndrome    • Seizure disorder ([facility])    • Vitamin D deficiency      Past Surgical History:   Procedure Laterality Date   • EXPLORATORY LAPAROTOMY     • PEG TUBE REMOVAL     • TRACHEOSTOMY CLOSURE/STOMA REVISION        General Information     Row Name 23 1458          Physical Therapy Time and Intention    Document Type therapy note (daily note)  -HP     Mode of Treatment physical therapy  -     Row Name 23 1458          General Information    Patient Profile Reviewed yes  -HP     Existing Precautions/Restrictions fall;oxygen therapy device and L/min;other (see comments);seizures  CP; TBI; L hemiplegia  -HP     Row Name 23 1453          Cognition    Orientation Status (Cognition)  oriented to;person;disoriented to;place  -     Row Name 03/28/23 1458          Safety Issues, Functional Mobility    Impairments Affecting Function (Mobility) balance;cognition;endurance/activity tolerance;strength;postural/trunk control;range of motion (ROM);motor control;shortness of breath;muscle tone abnormal;coordination  -           User Key  (r) = Recorded By, (t) = Taken By, (c) = Cosigned By    Initials Name Provider Type     Debbie Grewal, YANA Physical Therapist               Mobility     Row Name 03/28/23 1458          Bed Mobility    Bed Mobility scooting/bridging;supine-sit  -     Supine-Sit [place] (Bed Mobility) moderate assist (50% patient effort)  -     Assistive Device (Bed Mobility) bed rails;draw sheet  -     Comment, (Bed Mobility) Caregiver present and providing assist. Caregiver impulsive with VC for allowing increased pt participation.  -     Row Name 03/28/23 1458          Transfers    Comment, (Transfers) SPT to chair with Mod A x2. Caregiver impulsive and demonstrated decreased safety awareness. Pt able to progress STS Min A x2 to CGAx2 with proper VC for sequencing and for UE support.  -     Row Name 03/28/23 1458          Bed-Chair Transfer    Bed-Chair [place] (Transfers) moderate assist (50% patient effort);2 person assist;verbal cues;nonverbal cues (demo/gesture);1 person to manage equipment  -     Assistive Device (Bed-Chair Transfers) other (see comments)  B UE support  -     Row Name 03/28/23 1458          Sit-Stand Transfer    Sit-Stand [place] (Transfers) minimum assist (75% patient effort);2 person assist;verbal cues;contact guard  -     Assistive Device (Sit-Stand Transfers) other (see comments)  B UE support  -     Row Name 03/28/23 1458          Gait/Stairs (Locomotion)    Mountrail Level (Gait) not tested  -     Comment, (Gait/Stairs) Pt demonstrated difficulty with weight shifting and marching in place for pre-gait activities.  Gait no assessed this session.  -HP           User Key  (r) = Recorded By, (t) = Taken By, (c) = Cosigned By    Initials Name Provider Type    Debbie Calloway PT Physical Therapist               Obj/Interventions     Row Name 03/28/23 1608          Balance    Balance Assessment sitting static balance;sitting dynamic balance;sit to stand dynamic balance;standing static balance;standing dynamic balance  -HP     Static Sitting Balance contact guard  -HP     Dynamic Sitting Balance contact guard  -HP     Position, Sitting Balance sitting edge of bed  -HP     Static Standing Balance minimal assist  -HP     Dynamic Standing Balance minimal assist  -HP     Position/Device Used, Standing Balance supported  -HP     Balance Interventions sitting;standing;sit to stand;occupation based/functional task  -           User Key  (r) = Recorded By, (t) = Taken By, (c) = Cosigned By    Initials Name Provider Type    Debbie Calloway PT Physical Therapist               Goals/Plan    No documentation.                Clinical Impression     Row Name 03/28/23 1610          Pain    Pretreatment Pain Rating 0/10 - no pain  -HP     Posttreatment Pain Rating 0/10 - no pain  -HP     Row Name 03/28/23 1610          Plan of Care Review    Plan of Care Reviewed With patient  -HP     Progress no change  -HP     Outcome Evaluation Pt demonstrated good effort with therapy this date. Pt performed bed mobility with Mod A . Pt performed SPT to chair with Mod A . Pt progressed STS to CGAx2 with BUE support. Pt continues to be below baseline function with generalized weakness and balance impairments. Continue to recommend d/c home with 24/7 assist.  -     Row Name 03/28/23 1610          Vital Signs    Pre Systolic BP Rehab --  VSS  -HP     Pre Patient Position Supine  -HP     Intra Patient Position Standing  -HP     Post Patient Position Sitting  -HP     Row Name 03/28/23 1610          Positioning and Restraints    Pre-Treatment Position in bed   -     Post Treatment Position chair  -HP     In Chair notified nsg;reclined;sitting;call light within reach;encouraged to call for assist;exit alarm on;with family/caregiver;legs elevated;waffle cushion;on mechanical lift sling;compression device  -           User Key  (r) = Recorded By, (t) = Taken By, (c) = Cosigned By    Initials Name Provider Type     Debbie Grewal, PT Physical Therapist               Outcome Measures     Row Name 03/28/23 1613 03/28/23 0845       How much help from another person do you currently need...    Turning from your back to your side while in flat bed without using bedrails? 2  - 2  -MW    Moving from lying on back to sitting on the side of a flat bed without bedrails? 2  - 2  -MW    Moving to and from a bed to a chair (including a wheelchair)? 2  - 2  -MW    Standing up from a chair using your arms (e.g., wheelchair, bedside chair)? 3  - 2  -MW    Climbing 3-5 steps with a railing? 1  - 1  -MW    To walk in hospital room? 1  - 1  -MW    AM-PAC 6 Clicks Score (PT) 11  - 10  -MW    Highest level of mobility 4 --> Transferred to chair/commode  - 4 --> Transferred to chair/commode  -    Row Name 03/28/23 1613 03/28/23 1557       Functional Assessment    Outcome Measure Options AM-PAC 6 Clicks Basic Mobility (PT)  - AM-PAC 6 Clicks Daily Activity (OT)  -          User Key  (r) = Recorded By, (t) = Taken By, (c) = Cosigned By    Initials Name Provider Type    Ruth Ordonez, RN Registered Nurse     Debbie Grewal, PT Physical Therapist    Iwona Land, OT Occupational Therapist                             Physical Therapy Education     Title: PT OT SLP Therapies (In Progress)     Topic: Physical Therapy (Done)     Point: Mobility training (Done)     Learning Progress Summary           Patient Acceptance, E,D, VU,DU by  at 3/28/2023 1614    Acceptance, E,D, VU,NR by  at 3/26/2023 0814    Comment: Educated on benefits of mobility and being OOB.  Educated on safety with mobility, correct supine to sit t/f technique, correct sit<->stand t/f technique, correct bed to chair t/f technique, and progression of POC.                   Point: Home exercise program (Done)     Learning Progress Summary           Patient Acceptance, E,D, VU,DU by HP at 3/28/2023 1614    Acceptance, E,D, VU,NR by LR at 3/26/2023 0814    Comment: Educated on benefits of mobility and being OOB. Educated on safety with mobility, correct supine to sit t/f technique, correct sit<->stand t/f technique, correct bed to chair t/f technique, and progression of POC.                   Point: Body mechanics (Done)     Learning Progress Summary           Patient Acceptance, E,D, VU,DU by  at 3/28/2023 1614    Acceptance, E,D, VU,NR by LR at 3/26/2023 0814    Comment: Educated on benefits of mobility and being OOB. Educated on safety with mobility, correct supine to sit t/f technique, correct sit<->stand t/f technique, correct bed to chair t/f technique, and progression of POC.                   Point: Precautions (Done)     Learning Progress Summary           Patient Acceptance, E,D, VU,DU by  at 3/28/2023 1614    Acceptance, E,D, VU,NR by LR at 3/26/2023 0814    Comment: Educated on benefits of mobility and being OOB. Educated on safety with mobility, correct supine to sit t/f technique, correct sit<->stand t/f technique, correct bed to chair t/f technique, and progression of POC.                               User Key     Initials Effective Dates Name Provider Type Discipline     02/03/23 -  Yeny Bañuelos, PT Physical Therapist PT     06/01/21 -  Debbie Grewal, PT Physical Therapist PT              PT Recommendation and Plan     Plan of Care Reviewed With: patient  Progress: no change  Outcome Evaluation: Pt demonstrated good effort with therapy this date. Pt performed bed mobility with Mod A . Pt performed SPT to chair with Mod A . Pt progressed STS to CGAx2 with BUE support. Pt  continues to be below baseline function with generalized weakness and balance impairments. Continue to recommend d/c home with 24/7 assist.     Time Calculation:    PT Charges     Row Name 03/28/23 1412             Time Calculation    Start Time 1412  -HP      PT Received On 03/28/23  -HP         Timed Charges    04573 - Gait Training Minutes  --  -HP      10416 - PT Therapeutic Activity Minutes 15  -HP         Total Minutes    Timed Charges Total Minutes 15  -HP       Total Minutes 15  -HP            User Key  (r) = Recorded By, (t) = Taken By, (c) = Cosigned By    Initials Name Provider Type     Debbie Grewal PT Physical Therapist              Therapy Charges for Today     Code Description Service Date Service Provider Modifiers Qty    [id number] HC PT THERAPEUTIC ACT EA 15 MIN 3/28/2023 Debbie Grewal, PT GP 1          PT G-Codes  Outcome Measure Options: AM-PAC 6 Clicks Basic Mobility (PT)  AM-PAC 6 Clicks Score (PT): 11  AM-PAC 6 Clicks Score (OT): 11       Debbie Grewal PT  3/28/2023

## 2023-03-28 NOTE — PLAN OF CARE
Goal Outcome Evaluation:  Plan of Care Reviewed With: patient        Progress: no change  Outcome Evaluation: Pt demonstrated good effort with therapy this date. Pt performed bed mobility with Mod A . Pt performed SPT to chair with Mod A . Pt progressed STS to CGAx2 with BUE support. Pt continues to be below baseline function with generalized weakness and balance impairments. Continue to recommend d/c home with 24/7 assist.

## 2023-03-28 NOTE — PLAN OF CARE
Goal Outcome Evaluation:  Plan of Care Reviewed With: patient        Progress: no change  Outcome Evaluation: Pt demonstrating good effort w/ bed mobility, transfers and ADLs. Pt continues to require assist w/ transfers and ADLs leading him to be below baseline and warrant continuation of skilled IP OT services. SUA for grooming and MaxA for LB dressing. Continue to recommend return to group home w/ 24/7 assist caregivers.

## 2023-03-28 NOTE — PROGRESS NOTES
Nutrition Services    Patient Name:  Anderson Rey  YOB: 1965  MRN: [id number]  Admit Date:  3/24/2023    Pt screened for nsg report of chewing/swallowing difficulty. Pt seen by SLP on 3/27/23 and cleared for regular textures, thin liquids. Pt does not appear to otherwise meet nutrition risk screen criteria. Please consult RD if needed.     Electronically signed by:  Sima Osman RD  03/28/23 07:49 EDT

## 2023-03-28 NOTE — PROGRESS NOTES
[facility] Medicine Services  PROGRESS NOTE    Patient Name: Anderson Rey  : 1965  MRN: [id number]    Date of Admission: 3/24/2023  Primary Care Physician: Yelena Fuentes MD    Subjective     CC: f/u respiratory failure / pneumonia     HPI:  In bed, group home staff member at bedside. He notes mild dyspnea at rest. Cough getting better. I was able to wean to room air at bedside.   Hasn't had a BM in a few days     ROS:  Gen- No fevers, chills  CV- No chest pain, palpitations  Resp- as above   GI- No N/V/D, abd pain     Objective     Vital Signs:   Temp:  [98 °F (36.7 °C)-98.4 °F (36.9 °C)] 98.4 °F (36.9 °C)  Heart Rate:  [71-91] 71  Resp:  [16-20] 20  BP: (106-145)/(73-91) 120/74  Flow (L/min):  [1] 1     Physical Exam:  Constitutional: No acute distress, awake, alert and conversant. Laying in bed   HENT: NCAT, mucous membranes moist  Respiratory: Expiratory rhonchi, intermittent cough, normal respiratory effort on room air   Cardiovascular: RRR, no murmurs, rubs, or gallops  Gastrointestinal: Positive bowel sounds, soft, nontender, nondistended  Musculoskeletal: No bilateral ankle edema. LUE contractures  Psychiatric: Appropriate affect, cooperative  Neurologic: Moves all extremities spontaneously without focal deficits, speech clear and coherent     Results Reviewed:  LAB RESULTS:      Lab 23  0523  1841   WBC 6.89 10.26 10.84*   HEMOGLOBIN 12.5* 12.9* 15.2   HEMATOCRIT 37.4* 38.0 44.4   PLATELETS 222 191 211   NEUTROS ABS  --  7.10* 9.27*   IMMATURE GRANS (ABS)  --  0.05 0.03   LYMPHS ABS  --  1.86 0.67*   MONOS ABS  --  1.17* 0.84   EOS ABS  --  0.05 0.00   MCV 89.5 89.0 87.7   PROCALCITONIN  --   --  2.09*   LACTATE  --   --  1.2         Lab 23  0529 23  0402 23  1841   SODIUM 133* 130* 130*   POTASSIUM 4.0 3.8 3.7   CHLORIDE 98 96* 94*   CO2 27.0 27.0 25.0   ANION GAP 8.0 7.0 11.0   BUN 8 10 12   CREATININE 0.46*  0.53* 0.57*   EGFR 123.5 118.4 115.8   GLUCOSE 99 95 124*   CALCIUM 8.5* 8.0* 9.1   MAGNESIUM  --  1.7  --          Lab 03/24/23 1841   TOTAL PROTEIN 7.3   ALBUMIN 3.9   GLOBULIN 3.4   ALT (SGPT) 20   AST (SGOT) 20   BILIRUBIN 0.6   ALK PHOS 71         Lab 03/24/23 1841   PROBNP 36.7   HSTROP T 9     Brief Urine Lab Results  (Last result in the past 365 days)      Color   Clarity   Blood   Leuk Est   Nitrite   Protein   CREAT   Urine HCG        03/24/23 1927 Dark Yellow   Cloudy   Negative   Trace   Positive   100 mg/dL (2+)               Microbiology Results Abnormal     Procedure Component Value - Date/Time    Blood Culture - Blood, Hand, Right [[id number]]  (Normal) Collected: 03/24/23 1839    Lab Status: Preliminary result Specimen: Blood from Hand, Right Updated: 03/27/23 1931     Blood Culture No growth at 3 days    Blood Culture - Blood, Arm, Left [[id number]]  (Normal) Collected: 03/24/23 1623    Lab Status: Preliminary result Specimen: Blood from Arm, Left Updated: 03/27/23 1931     Blood Culture No growth at 3 days    COVID PRE-OP / PRE-PROCEDURE SCREENING ORDER (NO ISOLATION) - Swab, Nasopharynx [[id number]]  (Normal) Collected: 03/24/23 1853    Lab Status: Final result Specimen: Swab from Nasopharynx Updated: 03/24/23 1920    Narrative:      The following orders were created for panel order COVID PRE-OP / PRE-PROCEDURE SCREENING ORDER (NO ISOLATION) - Swab, Nasopharynx.  Procedure                               Abnormality         Status                     ---------                               -----------         ------                     COVID-19 and FLU A/B PCR...[[id number]]  Normal              Final result                 Please view results for these tests on the individual orders.    COVID-19 and FLU A/B PCR - Swab, Nasopharynx [[id number]]  (Normal) Collected: 03/24/23 1853    Lab Status: Final result Specimen: Swab from Nasopharynx Updated: 03/24/23 1920     COVID19 Not Detected     Influenza A  PCR Not Detected     Influenza B PCR Not Detected    Narrative:      Fact sheet for providers: https://www.fda.gov/media/341009/download    Fact sheet for patients: https://www.fda.gov/media/318915/download    Test performed by PCR.        No radiology results from the last 24 hrs    Current medications:  Scheduled Meds:atorvastatin, 20 mg, Oral, Nightly  bisacodyl, 10 mg, Oral, Once  bisacodyl, 10 mg, Rectal, Once  brimonidine, 1 drop, Left Eye, BID   And  timolol, 1 drop, Left Eye, Daily  carBAMazepine, 200 mg, Oral, TID  cefTRIAXone, 1 g, Intravenous, Q24H  doxycycline, 100 mg, Oral, Q12H  guaiFENesin, 1,200 mg, Oral, Q12H  ipratropium-albuterol, 3 mL, Nebulization, 4x Daily - RT  latanoprost, 1 drop, Left Eye, Nightly  magnesium hydroxide, 10 mL, Oral, Once  multivitamin with minerals, 1 tablet, Oral, QAM  oxybutynin, 5 mg, Oral, TID  senna-docusate sodium, 2 tablet, Oral, BID  sodium chloride, 10 mL, Intravenous, Q12H  traZODone, 50 mg, Oral, Nightly      Continuous Infusions:   PRN Meds:.•  acetaminophen **OR** acetaminophen **OR** acetaminophen  •  benzonatate  •  ondansetron **OR** ondansetron  •  sodium chloride  •  sodium chloride  •  sodium chloride    Assessment & Plan     Active Hospital Problems    Diagnosis  POA   • **Sepsis ([facility]) [A41.9]  Unknown   • Pneumonia of left lower lobe due to infectious organism [J18.9]  Yes   • Acute UTI (urinary tract infection) [N39.0]  Unknown   • Hyponatremia [E87.1]  Unknown   • TBI (traumatic brain injury) ([facility]) [S06.9XAA]  Unknown   • Familial hypercholesterolemia [E78.01]  Yes   • QUEENIE (generalized anxiety disorder) [F41.1]  Yes   • Hypoxemia [R09.02]  Yes   • Seizure disorder ([facility]) [G40.909]  Yes   • OAB (overactive bladder) [N32.81]  Yes      Resolved Hospital Problems   No resolved problems to display.     Brief Hospital Course to date:  Anderson Rey is a 57 y.o. male with PMH significant for prior TBI with chronic physical/mental deficits, seizure disorder,  HLD, overactive and anxiety. He resides in a group home. He presented to [facility] ED on 3/24/2023 for evaluation of cough, fever and anorexia. He was borderline hypoxic in ED and imaging consistent with pneumonia.     LLL lingular pneumonia with hypoxia   - CTA w/o evidence of PE but consistent with LLL/lingula airspace disease  - Continue IV Rocephin / PO Doxycycline   - Continue Mucinex and OPEP  - Wean O2 as able. Will need to be off of O2 in order to return to group home. I weaned to room air today  - SLP has seen - cleared for regular diet with thin liquids     Bacteruria  - Abnormal UA but no urinary symptoms  - Urine culture growing 50,000 CFU areococcus urinae     HLD  - Continue statin    Mild hyponatremia, likely not clinically significant  - Appears chronic (limited prior data but present in 2017)  - Suspect related to prior TBI and seizure meds  - Sodium stable    Glaucoma  - Continue home eye drops    TBI  Seizure disorder  - Requires increased care  - Continue home Carbamazepine, Trazodone    Expected Discharge Location and Transportation: back to group home if stable on room air over next 24 hours   Expected Discharge   Expected Discharge Date and Time     Expected Discharge Date Expected Discharge Time    Mar 29, 2023          DVT prophylaxis:Mechanical DVT prophylaxis orders are present.     AM-PAC 6 Clicks Score (PT): 10 (03/28/23 0684)    CODE STATUS:   Code Status and Medical Interventions:   Ordered at: 03/24/23 2116     Level Of Support Discussed With:    Patient     Code Status (Patient has no pulse and is not breathing):    CPR (Attempt to Resuscitate)     Medical Interventions (Patient has pulse or is breathing):    Full Support     Marilee Daigle PA-C  03/28/23

## 2023-03-29 ENCOUNTER — READMISSION MANAGEMENT (OUTPATIENT)
Dept: CALL CENTER | Facility: HOSPITAL | Age: 58
End: 2023-03-29
Payer: MEDICARE

## 2023-03-29 VITALS
WEIGHT: 205.9 LBS | HEART RATE: 83 BPM | OXYGEN SATURATION: 91 % | RESPIRATION RATE: 18 BRPM | DIASTOLIC BLOOD PRESSURE: 89 MMHG | TEMPERATURE: 97.8 F | BODY MASS INDEX: 27.89 KG/M2 | SYSTOLIC BLOOD PRESSURE: 133 MMHG | HEIGHT: 72 IN

## 2023-03-29 PROBLEM — R09.02 HYPOXEMIA: Status: RESOLVED | Noted: 2017-11-01 | Resolved: 2023-03-29

## 2023-03-29 PROBLEM — N39.0 ACUTE UTI (URINARY TRACT INFECTION): Status: RESOLVED | Noted: 2023-03-24 | Resolved: 2023-03-29

## 2023-03-29 LAB
BACTERIA SPEC AEROBE CULT: NORMAL
BACTERIA SPEC AEROBE CULT: NORMAL
QT INTERVAL: 304 MS
QTC INTERVAL: 441 MS

## 2023-03-29 PROCEDURE — 94799 UNLISTED PULMONARY SVC/PX: CPT

## 2023-03-29 PROCEDURE — 92526 ORAL FUNCTION THERAPY: CPT

## 2023-03-29 PROCEDURE — 94761 N-INVAS EAR/PLS OXIMETRY MLT: CPT

## 2023-03-29 PROCEDURE — 99239 HOSP IP/OBS DSCHRG MGMT >30: CPT | Performed by: PHYSICIAN ASSISTANT

## 2023-03-29 RX ORDER — CEFUROXIME AXETIL 500 MG/1
500 TABLET ORAL 2 TIMES DAILY
Qty: 4 TABLET | Refills: 0 | Status: SHIPPED | OUTPATIENT
Start: 2023-03-29 | End: 2023-03-31

## 2023-03-29 RX ORDER — GUAIFENESIN 600 MG/1
TABLET, EXTENDED RELEASE ORAL
Qty: 60 TABLET | Refills: 2 | Status: SHIPPED | OUTPATIENT
Start: 2023-03-29 | End: 2024-04-04

## 2023-03-29 RX ORDER — DOXYCYCLINE 100 MG/1
100 CAPSULE ORAL EVERY 12 HOURS SCHEDULED
Qty: 4 CAPSULE | Refills: 0 | Status: SHIPPED | OUTPATIENT
Start: 2023-03-29 | End: 2023-03-31

## 2023-03-29 RX ADMIN — OXYBUTYNIN CHLORIDE 5 MG: 5 TABLET ORAL at 08:36

## 2023-03-29 RX ADMIN — Medication 10 ML: at 08:39

## 2023-03-29 RX ADMIN — MULTIPLE VITAMINS W/ MINERALS TAB 1 TABLET: TAB at 08:36

## 2023-03-29 RX ADMIN — DOXYCYCLINE 100 MG: 100 CAPSULE ORAL at 08:37

## 2023-03-29 RX ADMIN — BRIMONIDINE TARTRATE 1 DROP: 2 SOLUTION OPHTHALMIC at 08:38

## 2023-03-29 RX ADMIN — SENNOSIDES AND DOCUSATE SODIUM 2 TABLET: 8.6; 5 TABLET ORAL at 08:37

## 2023-03-29 RX ADMIN — GUAIFENESIN 1200 MG: 600 TABLET, EXTENDED RELEASE ORAL at 08:36

## 2023-03-29 RX ADMIN — IPRATROPIUM BROMIDE AND ALBUTEROL SULFATE 3 ML: 2.5; .5 SOLUTION RESPIRATORY (INHALATION) at 12:29

## 2023-03-29 RX ADMIN — CARBAMAZEPINE 200 MG: 200 TABLET ORAL at 08:37

## 2023-03-29 RX ADMIN — TIMOLOL MALEATE 1 DROP: 5 SOLUTION/ DROPS OPHTHALMIC at 08:38

## 2023-03-29 RX ADMIN — IPRATROPIUM BROMIDE AND ALBUTEROL SULFATE 3 ML: 2.5; .5 SOLUTION RESPIRATORY (INHALATION) at 07:56

## 2023-03-29 NOTE — CASE MANAGEMENT/SOCIAL WORK
Continued Stay Note  [facility]     Patient Name: Anderson Rey  MRN: [id number]  Today's Date: 3/29/2023    Admit Date: 3/24/2023    Plan: Home   Discharge Plan     Row Name 03/29/23 1105       Plan    Plan Comments Pt to discharge back to his group home. Per staff they will transport. No discharge needs at this time    Row Name 03/29/23 1104       Plan    Final Discharge Disposition Code 01 - home or self-care               Discharge Codes    No documentation.               Expected Discharge Date and Time     Expected Discharge Date Expected Discharge Time    Mar 29, 2023             Eliane Sanford RN
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
## 2023-03-29 NOTE — PLAN OF CARE
Goal Outcome Evaluation:  Plan of Care Reviewed With: patient, caregiver    SLP treatment completed. Will continue to address diet tolerance. Please see note for further details and recommendations.

## 2023-03-29 NOTE — THERAPY TREATMENT NOTE
Acute Care - Speech Language Pathology   Swallow Treatment Note [facility]     Patient Name: Anderson Rey  : 1965  MRN: [id number]  Today's Date: 3/29/2023               Admit Date: 3/24/2023    Visit Dx:     ICD-10-CM ICD-9-CM   1. Pneumonia of left lower lobe due to infectious organism  J18.9 486   2. History of cerebral palsy  Z86.69 V12.49   3. Sepsis, due to unspecified organism, unspecified whether acute organ dysfunction present (Formerly [facility])  A41.9 038.9     995.91   4. Urinary tract infection without hematuria, site unspecified  N39.0 599.0   5. Dysphagia, unspecified type  R13.10 787.20     Patient Active Problem List   Diagnosis   • Familial hypercholesterolemia   • Glaucoma   • QUEENIE (generalized anxiety disorder)   • Seizure disorder (Formerly [facility])   • OAB (overactive bladder)   • Pneumonia of right lower lobe due to infectious organism   • Acute bronchitis   • Pneumonia of left lower lobe due to infectious organism   • Hyponatremia   • TBI (traumatic brain injury) (Formerly [facility])     Past Medical History:   Diagnosis Date   • Cerebral palsy, hemiplegic (Formerly [facility])    • QUEENIE (generalized anxiety disorder)    • Glaucoma    • HLD (hyperlipidemia)    • OAB (overactive bladder)    • Post-traumatic brain syndrome    • Seizure disorder (Formerly [facility])    • Vitamin D deficiency      Past Surgical History:   Procedure Laterality Date   • EXPLORATORY LAPAROTOMY     • PEG TUBE REMOVAL     • TRACHEOSTOMY CLOSURE/STOMA REVISION         SLP Recommendation and Plan  SLP Swallowing Diagnosis: functional oral phase, swallow WFL/no suspected pharyngeal impairment (23 1330)  SLP Diet Recommendation: regular textures, thin liquids (23 1330)  Recommended Precautions and Strategies: upright posture during/after eating, small bites of food and sips of liquid, general aspiration precautions (23 1330)  SLP Rec. for Method of Medication Administration: meds whole, meds crushed, with puree, as tolerated (23 1330)      Monitor for Signs of Aspiration: yes, notify SLP if any concerns (03/29/23 1330)     Swallow Criteria for Skilled Therapeutic Interventions Met: demonstrates skilled criteria (03/29/23 1330)  Anticipated Discharge Disposition (SLP): anticipate therapy at next level of care, inpatient rehabilitation facility (03/29/23 1330)  Rehab Potential/Prognosis, Swallowing: good, to achieve stated therapy goals (03/29/23 1330)  Therapy Frequency (Swallow): PRN (03/29/23 1330)  Predicted Duration Therapy Intervention (Days): until discharge (03/29/23 1330)        Daily Summary of Progress (SLP): progress toward functional goals as expected (03/29/23 1330)               Treatment Assessment (SLP): continued, toleration of diet (03/29/23 1330)     Plan for Continued Treatment (SLP): treatment no longer indicated as all goals met (03/29/23 1330)                SWALLOW EVALUATION (last 72 hours)     SLP Adult Swallow Evaluation     Row Name 03/29/23 1330 03/27/23 1505 03/27/23 0850             Rehab Evaluation    Document Type therapy note (daily note)  - evaluation  - evaluation  -      Subjective Information no complaints  - no complaints  - no complaints  -      Patient Observations alert;cooperative;agree to therapy  - alert;cooperative  - alert;cooperative  -      Patient/Family/Caregiver Comments/Observations caregiver present  - No family present  - no family present  -      Patient Effort good  - good  - good  -      Symptoms Noted During/After Treatment none  - none  - none  -         General Information    Patient Profile Reviewed yes  - yes  - yes  -      Pertinent History Of Current Problem -- See previous eval, pt referred for MBS. Completed in conjunction w/ CJ  - adm w/ sepsis; sig h/o QUEENIE, seizures, PNA of LLL, TBI, bronchitis  -      Current Method of Nutrition -- regular textures;nectar/syrup-thick liquids  - regular textures;thin liquids  -       Precautions/Limitations, Vision -- WFL;for purposes of eval  - WFL;for purposes of eval  -      Precautions/Limitations, Hearing -- WFL;for purposes of eval  - WFL;for purposes of eval  -CJ      Prior Level of Function-Communication -- cognitive-linguistic impairment;other (see comments)  TBI  - cognitive-linguistic impairment;other (see comments)  TBI  -CJ      Prior Level of Function-Swallowing -- unknown  - unknown  -      Plans/Goals Discussed with -- patient  - patient  -      Barriers to Rehab -- medically complex;cognitive status  - medically complex;cognitive status  -      Patient's Goals for Discharge -- patient did not state  - patient did not state  -         Pain    Additional Documentation -- Pain Scale: FACES Pre/Post-Treatment (Group)  - Pain Scale: FACES Pre/Post-Treatment (Group)  -         Pain Scale: FACES Pre/Post-Treatment    Pain: FACES Scale, Pretreatment -- 0-->no hurt  - 0-->no hurt  -      Posttreatment Pain Rating -- 0-->no hurt  - 0-->no hurt  -         Oral Motor Structure and Function    Dentition Assessment -- -- natural, present and adequate  -      Secretion Management -- -- WNL/WFL  -      Mucosal Quality -- -- moist, healthy  -         Oral Musculature and Cranial Nerve Assessment    Oral Motor General Assessment -- -- WFL  -         General Eating/Swallowing Observations    Respiratory Support Currently in Use -- -- nasal cannula  -      O2 Liters -- -- 2L  -      Eating/Swallowing Skills -- -- fed by SLP  -      Positioning During Eating -- -- upright in bed  -      Utensils Used -- -- spoon;cup;straw  -      Consistencies Trialed -- -- regular textures;pureed;ice chips;thin liquids;nectar/syrup-thick liquids  -         Clinical Swallow Eval    Pharyngeal Phase -- -- suspected pharyngeal impairment  -      Clinical Swallow Evaluation Summary -- -- Oral phase is generally weak. Overt s/s of aspiration w/ trials of thin  liquids via cup/straw. No overt s/s of aspiration w/ puree, solids or nectar thick liquids. Will modify po diet to regular/nectar thick, no mixed until [facility] [place]  -         Pharyngeal Phase Concerns    Pharyngeal Phase Concerns -- -- cough  -CJ      Cough -- -- thin  -CJ         MBS/VFSS    Utensils Used -- spoon;cup;straw  - --      Consistencies Trialed -- thin liquids;pudding thick;regular textures  - --         MBS/VFSS Interpretation    Oral Prep Phase -- WFL  - --      Oral Transit Phase -- WFL  - --      Oral Residue -- WFL  - --      VFSS Summary -- Grossly functional oral and pharyngeal phases of swallow. Prespill w/ thins to the pyriforms. High/transient penetration w/ thins occuring during the swallow, material noted to fully clear upon completion of the swallow. No further events of laryngeal penetration or aspiration observed accross study. Pt ok to initiate regular diet w/ thin liquids, general aspiration precautions. SLP will f/u for diet tolerace/education.  - --         Initiation of Pharyngeal Swallow    Initiation of Pharyngeal Swallow -- bolus in pyriform sinuses  - --      Pharyngeal Phase -- functional pharyngeal phase of swallowing  - --      Penetration During the Swallow -- thin liquids;secondary to reduced vestibular closure;secondary to reduced laryngeal elevation;secondary to delayed swallow initiation or mistiming  - --      Depth of Penetration -- shallow;transient  - --      Response to Penetration -- Yes  - --      Responsed to penetration with -- other (see comments)  fully cleared upon completion of swallow  - --      Rosenbek's Scale -- thin:;2--->level 2;pudding/puree:;regular textures:;1--->level 1  - --      Pharyngeal Residue -- pyriform sinuses;posterior pharyngeal wall;laryngeal vestibule;secondary to reduced hyolaryngeal excursion;secondary to reduced laryngeal elevation;secondary to reduced posterior pharyngeal wall stripping  - --       Response to Residue -- cleared residue with cued swallow;cleared residue with liquid wash  - --      Attempted Compensatory Maneuvers -- bolus size;bolus presentation style;additional subsequent swallow  - --      Response to Attempted Compensatory Maneuvers -- did not prevent penetration;reduced residue  - --      Successful Compensatory Maneuver Competency -- patient able to;demonstrate compensations;with cues  - --         SLP Evaluation Clinical Impression    SLP Swallowing Diagnosis functional oral phase;swallow WFL/no suspected pharyngeal impairment  - functional oral phase;swallow WFL/no suspected pharyngeal impairment  - suspected pharyngeal dysphagia  -      Functional Impact no impact on function  - no impact on function  - risk of aspiration/pneumonia  -      Rehab Potential/Prognosis, Swallowing good, to achieve stated therapy goals  - good, to achieve stated therapy goals  - good, to achieve stated therapy goals  -      Swallow Criteria for Skilled Therapeutic Interventions Met demonstrates skilled criteria  - demonstrates skilled criteria  - demonstrates skilled criteria  -         SLP Treatment Clinical Impressions    Treatment Assessment (SLP) continued;toleration of diet  - -- --      Daily Summary of Progress (SLP) progress toward functional goals as expected  - -- --      Barriers to Overall Progress (SLP) Cognitive status  - -- --      Plan for Continued Treatment (SLP) treatment no longer indicated as all goals met  - -- --      Care Plan Review evaluation/treatment results reviewed  - -- --         Recommendations    Therapy Frequency (Swallow) PRN  - PRN  - --      Predicted Duration Therapy Intervention (Days) until discharge  - until discharge  - until discharge  -      SLP Diet Recommendation regular textures;thin liquids  - regular textures;thin liquids  - regular textures;no mixed consistencies;nectar thick liquids  -       Recommended Diagnostics -- other (see comments)  diet tolerance  - reassess via VFSS ([facility])  -      Recommended Precautions and Strategies upright posture during/after eating;small bites of food and sips of liquid;general aspiration precautions  - upright posture during/after eating;small bites of food and sips of liquid;general aspiration precautions  - upright posture during/after eating;small bites of food and sips of liquid;general aspiration precautions  -      Oral Care Recommendations Oral Care BID/PRN;Suction toothbrush  - Oral Care BID/PRN;Suction toothbrush  - Oral Care BID/PRN;Suction toothbrush  -      SLP Rec. for Method of Medication Administration meds whole;meds crushed;with puree;as tolerated  - meds whole;meds crushed;with puree;as tolerated  - meds whole;meds crushed;with puree;as tolerated  -      Monitor for Signs of Aspiration yes;notify SLP if any concerns  - yes;notify SLP if any concerns  - yes;notify SLP if any concerns  -      Anticipated Discharge Disposition (SLP) anticipate therapy at next level of care;inpatient rehabilitation facility  - anticipate therapy at next level of care;inpatient rehabilitation facility  - anticipate therapy at next level of care;inpatient rehabilitation facility  -            User Key  (r) = Recorded By, (t) = Taken By, (c) = Cosigned By    Initials Name Effective Dates     Shivani Avitia, MS CCC-SLP 06/16/21 -      Ava Vidales MS CCC-SLP 06/16/21 -      Karli Herman, MS, NAVEED-SLP 06/22/22 -                 EDUCATION  The patient has been educated in the following areas:   Dysphagia (Swallowing Impairment) Oral Care/Hydration Modified Diet Instruction.        SLP GOALS     Row Name 03/29/23 2917 03/27/23 0250          (LTG) Patient will demonstrate functional swallow for    Diet Texture (Demonstrate functional swallow) regular textures  - regular textures  -     Liquid viscosity (Demonstrate functional  swallow) thin liquids  - thin liquids  -     [place] (Demonstrate functional swallow) independently (over 90% accuracy)  - independently (over 90% accuracy)  -     Time Frame (Demonstrate functional swallow) by discharge  - by discharge  -     Progress/Outcomes (Demonstrate functional swallow) continuing progress toward goal  - new goal  -     Comment (Demonstrate functional swallow) Cough x 1 following trial of fernando cracker and thin liquid wash. No additional s/s of aspiration with subsequent trials of regular solids or thin liquids.  -CH --        (STG) Patient will tolerate trials of    Consistencies Trialed (Tolerate trials) regular textures;thin liquids  - regular textures;thin liquids  -     Desired Outcome (Tolerate trials) without signs/symptoms of aspiration  - without signs/symptoms of aspiration  -     [place] (Tolerate trials) independently (over 90% accuracy)  - independently (over 90% accuracy)  -     Time Frame (Tolerate trials) 1 week  - 1 week  -     Progress/Outcomes (Tolerate trials) continuing progress toward goal  - new goal  -     Comment (Tolerate trials) Cough x 1 following trial of fernando cracker and thin liquid wash. No additional s/s of aspiration with subsequent trials of regular solids or thin liquids.  -CH --           User Key  (r) = Recorded By, (t) = Taken By, (c) = Cosigned By    Initials Name Provider Type     Ava Vidales, MS CCC-SLP Speech and Language Pathologist     Karli Herman MS, CFY-SLP Speech and Language Pathologist                   Time Calculation:    Time Calculation- SLP     Row Name 03/29/23 1538             Time Calculation- [facility]    SLP Start Time 1330  -      SLP Received On 03/29/23  -         Untimed Charges    33507-GL Treatment Swallow Minutes 38  -CH         Total Minutes    Untimed Charges Total Minutes 38  -CH       Total Minutes 38  -CH            User Key  (r) = Recorded By, (t) = Taken By,  (c) = Cosigned By    Initials Name Provider Type    Ava Isbell MS CCC-SLP Speech and Language Pathologist                Therapy Charges for Today     Code Description Service Date Service Provider Modifiers Qty    [id number]  ST TREATMENT SWALLOW 3 3/29/2023 Ava Vidales MS CCC-SLP GN 1               Ava Vidales MS CCC-SLP  3/29/2023

## 2023-03-30 NOTE — OUTREACH NOTE
Prep Survey    Flowsheet Row Responses   [facility] facility patient discharged from? [place]   Is LACE score < 7 ? No   Eligibility Readm Mgmt   Discharge diagnosis Pneumonia of left lower lobe due to infectious organism   Does the patient have one of the following disease processes/diagnoses(primary or secondary)? Pneumonia   Does the patient have Home health ordered? No   Is there a DME ordered? No   Prep survey completed? Yes          iNlsa ENNIS - Registered Nurse

## 2023-04-10 ENCOUNTER — READMISSION MANAGEMENT (OUTPATIENT)
Dept: CALL CENTER | Facility: HOSPITAL | Age: 58
End: 2023-04-10
Payer: MEDICARE

## 2023-04-10 NOTE — OUTREACH NOTE
COPD/PN Week 2 Survey    Flowsheet Row Responses   [facility] facility patient discharged from? [place]   Does the patient have one of the following disease processes/diagnoses(primary or secondary)? Pneumonia   Week 2 attempt successful? No   Unsuccessful attempts Attempt 1   Revoke Other transitional program  [patient resides in a group home with 24/7 caregivers]          Dottie ROTHMAN - Registered Nurse

## 2025-03-15 ENCOUNTER — HOSPITAL ENCOUNTER (EMERGENCY)
Facility: HOSPITAL | Age: 60
Discharge: HOME | End: 2025-03-15
Attending: EMERGENCY MEDICINE
Payer: MEDICARE

## 2025-03-15 ENCOUNTER — APPOINTMENT (OUTPATIENT)
Dept: CT IMAGING | Facility: HOSPITAL | Age: 60
End: 2025-03-15
Payer: MEDICARE

## 2025-03-15 VITALS
DIASTOLIC BLOOD PRESSURE: 87 MMHG | SYSTOLIC BLOOD PRESSURE: 135 MMHG | RESPIRATION RATE: 16 BRPM | TEMPERATURE: 97.5 F | WEIGHT: 205.03 LBS | HEART RATE: 69 BPM | HEIGHT: 72 IN | OXYGEN SATURATION: 95 % | BODY MASS INDEX: 27.77 KG/M2

## 2025-03-15 DIAGNOSIS — S16.1XXA STRAIN OF NECK MUSCLE, INITIAL ENCOUNTER: ICD-10-CM

## 2025-03-15 DIAGNOSIS — Z02.89 REFERRED BY HEALTH CARE PROFESSIONAL: ICD-10-CM

## 2025-03-15 DIAGNOSIS — S01.511A LIP LACERATION, INITIAL ENCOUNTER: ICD-10-CM

## 2025-03-15 DIAGNOSIS — Z87.898 HISTORY OF SEIZURES: ICD-10-CM

## 2025-03-15 DIAGNOSIS — Z87.820 HISTORY OF TRAUMATIC BRAIN INJURY: ICD-10-CM

## 2025-03-15 DIAGNOSIS — S09.90XA ACUTE HEAD INJURY, INITIAL ENCOUNTER: Primary | ICD-10-CM

## 2025-03-15 LAB
ALBUMIN SERPL-MCNC: 4 G/DL (ref 3.5–5.2)
ALBUMIN/GLOB SERPL: 1.3 G/DL
ALP SERPL-CCNC: 77 U/L (ref 39–117)
ALT SERPL W P-5'-P-CCNC: 18 U/L (ref 1–41)
ANION GAP SERPL CALCULATED.3IONS-SCNC: 14 MMOL/L (ref 5–15)
AST SERPL-CCNC: 19 U/L (ref 1–40)
BASOPHILS # BLD AUTO: 0.04 10*3/MM3 (ref 0–0.2)
BASOPHILS NFR BLD AUTO: 0.6 % (ref 0–1.5)
BILIRUB SERPL-MCNC: 0.2 MG/DL (ref 0–1.2)
BILIRUB UR QL STRIP: NEGATIVE
BUN SERPL-MCNC: 5 MG/DL (ref 6–20)
BUN/CREAT SERPL: 11.1 (ref 7–25)
CALCIUM SPEC-SCNC: 9.1 MG/DL (ref 8.6–10.5)
CHLORIDE SERPL-SCNC: 93 MMOL/L (ref 98–107)
CK SERPL-CCNC: 80 U/L (ref 20–200)
CLARITY UR: CLEAR
CO2 SERPL-SCNC: 24 MMOL/L (ref 22–29)
COLOR UR: YELLOW
CREAT SERPL-MCNC: 0.45 MG/DL (ref 0.76–1.27)
D-LACTATE SERPL-SCNC: 1.8 MMOL/L (ref 0.5–2)
DEPRECATED RDW RBC AUTO: 42 FL (ref 37–54)
EGFRCR SERPLBLD CKD-EPI 2021: 122.8 ML/MIN/1.73
EOSINOPHIL # BLD AUTO: 0.12 10*3/MM3 (ref 0–0.4)
EOSINOPHIL NFR BLD AUTO: 1.8 % (ref 0.3–6.2)
ERYTHROCYTE [DISTWIDTH] IN BLOOD BY AUTOMATED COUNT: 12.9 % (ref 12.3–15.4)
GEN 5 1HR TROPONIN T REFLEX: <6 NG/L
GLOBULIN UR ELPH-MCNC: 3 GM/DL
GLUCOSE SERPL-MCNC: 108 MG/DL (ref 65–99)
GLUCOSE UR STRIP-MCNC: NEGATIVE MG/DL
HCT VFR BLD AUTO: 47.3 % (ref 37.5–51)
HGB BLD-MCNC: 15.8 G/DL (ref 13–17.7)
HGB UR QL STRIP.AUTO: NEGATIVE
IMM GRANULOCYTES # BLD AUTO: 0.02 10*3/MM3 (ref 0–0.05)
IMM GRANULOCYTES NFR BLD AUTO: 0.3 % (ref 0–0.5)
KETONES UR QL STRIP: NEGATIVE
LEUKOCYTE ESTERASE UR QL STRIP.AUTO: NEGATIVE
LYMPHOCYTES # BLD AUTO: 1.74 10*3/MM3 (ref 0.7–3.1)
LYMPHOCYTES NFR BLD AUTO: 26.2 % (ref 19.6–45.3)
MCH RBC QN AUTO: 29.6 PG (ref 26.6–33)
MCHC RBC AUTO-ENTMCNC: 33.4 G/DL (ref 31.5–35.7)
MCV RBC AUTO: 88.7 FL (ref 79–97)
MONOCYTES # BLD AUTO: 0.56 10*3/MM3 (ref 0.1–0.9)
MONOCYTES NFR BLD AUTO: 8.4 % (ref 5–12)
NEUTROPHILS NFR BLD AUTO: 4.17 10*3/MM3 (ref 1.7–7)
NEUTROPHILS NFR BLD AUTO: 62.7 % (ref 42.7–76)
NITRITE UR QL STRIP: NEGATIVE
NRBC BLD AUTO-RTO: 0 /100 WBC (ref 0–0.2)
NT-PROBNP SERPL-MCNC: <36 PG/ML (ref 0–900)
PH UR STRIP.AUTO: 7 [PH] (ref 5–8)
PLATELET # BLD AUTO: 247 10*3/MM3 (ref 140–450)
PMV BLD AUTO: 9.5 FL (ref 6–12)
POTASSIUM SERPL-SCNC: 3.7 MMOL/L (ref 3.5–5.2)
PROT SERPL-MCNC: 7 G/DL (ref 6–8.5)
PROT UR QL STRIP: NEGATIVE
QT INTERVAL: 388 MS
QTC INTERVAL: 415 MS
RBC # BLD AUTO: 5.33 10*6/MM3 (ref 4.14–5.8)
SODIUM SERPL-SCNC: 131 MMOL/L (ref 136–145)
SP GR UR STRIP: 1.01 (ref 1–1.03)
TROPONIN T NUMERIC DELTA: NORMAL
TROPONIN T SERPL HS-MCNC: <6 NG/L
UROBILINOGEN UR QL STRIP: NORMAL
WBC NRBC COR # BLD AUTO: 6.65 10*3/MM3 (ref 3.4–10.8)

## 2025-03-15 PROCEDURE — 81003 URINALYSIS AUTO W/O SCOPE: CPT | Performed by: NURSE PRACTITIONER

## 2025-03-15 PROCEDURE — 74176 CT ABD & PELVIS W/O CONTRAST: CPT

## 2025-03-15 PROCEDURE — 84484 ASSAY OF TROPONIN QUANT: CPT | Performed by: NURSE PRACTITIONER

## 2025-03-15 PROCEDURE — 72125 CT NECK SPINE W/O DYE: CPT

## 2025-03-15 PROCEDURE — 99284 EMERGENCY DEPT VISIT MOD MDM: CPT

## 2025-03-15 PROCEDURE — 83880 ASSAY OF NATRIURETIC PEPTIDE: CPT | Performed by: NURSE PRACTITIONER

## 2025-03-15 PROCEDURE — 36415 COLL VENOUS BLD VENIPUNCTURE: CPT

## 2025-03-15 PROCEDURE — 85025 COMPLETE CBC W/AUTO DIFF WBC: CPT | Performed by: NURSE PRACTITIONER

## 2025-03-15 PROCEDURE — 70450 CT HEAD/BRAIN W/O DYE: CPT

## 2025-03-15 PROCEDURE — 82550 ASSAY OF CK (CPK): CPT | Performed by: NURSE PRACTITIONER

## 2025-03-15 PROCEDURE — 83605 ASSAY OF LACTIC ACID: CPT | Performed by: NURSE PRACTITIONER

## 2025-03-15 PROCEDURE — 93005 ELECTROCARDIOGRAM TRACING: CPT | Performed by: NURSE PRACTITIONER

## 2025-03-15 PROCEDURE — 71250 CT THORAX DX C-: CPT

## 2025-03-15 PROCEDURE — 80053 COMPREHEN METABOLIC PANEL: CPT | Performed by: NURSE PRACTITIONER

## 2025-03-15 RX ORDER — SODIUM CHLORIDE 0.9 % (FLUSH) 0.9 %
10 SYRINGE (ML) INJECTION AS NEEDED
Status: DISCONTINUED | OUTPATIENT
Start: 2025-03-15 | End: 2025-03-15 | Stop reason: HOSPADM

## 2025-03-15 NOTE — ED PROVIDER NOTES
Subjective   History of Present Illness  Patient presents by EMS with reported fall.  Typically wheelchair-bound with chronic physical deformities.  I am unable to reach the caregiver at the listed number.  It is reported that the patient fell.  He has some bleeding from the corner of his left lip.  Also reported that he has had some neck pain.  He has a history of cerebral palsy.  Right-sided weakness.  Traumatic brain injury.  Thought that the patient was on the ground for around an hour.        Review of Systems    Past Medical History:   Diagnosis Date    Cerebral palsy, hemiplegic     QUEENIE (generalized anxiety disorder)     Glaucoma     HLD (hyperlipidemia)     OAB (overactive bladder)     Post-traumatic brain syndrome 1987    Seizure disorder     Vitamin D deficiency        No Known Allergies    Past Surgical History:   Procedure Laterality Date    EXPLORATORY LAPAROTOMY  1987    PEG TUBE REMOVAL  1987    TRACHEOSTOMY CLOSURE/STOMA REVISION  1987       Family History   Problem Relation Age of Onset    No Known Problems Mother     No Known Problems Father        Social History     Socioeconomic History    Marital status: Single     Spouse name: N/A    Number of children: 0    Years of education: H.S.   Tobacco Use    Smoking status: Never    Smokeless tobacco: Never   Vaping Use    Vaping status: Never Used   Substance and Sexual Activity    Alcohol use: No    Drug use: No    Sexual activity: Never           Objective   Physical Exam  Constitutional:       Appearance: He is well-developed.      Comments: Chronically ill-appearing.   HENT:      Head: Normocephalic and atraumatic.      Comments: He does have some bleeding from the corner of his left lip.  Nothing to suture.  I do not appreciate any broken teeth or cut on his tongue.     Right Ear: External ear normal.      Left Ear: External ear normal.      Nose: Nose normal.   Eyes:      Conjunctiva/sclera: Conjunctivae normal.      Pupils: Pupils are equal,  round, and reactive to light.   Cardiovascular:      Rate and Rhythm: Normal rate and regular rhythm.      Heart sounds: Normal heart sounds.   Pulmonary:      Effort: Pulmonary effort is normal.      Breath sounds: Normal breath sounds.   Abdominal:      General: Bowel sounds are normal.      Palpations: Abdomen is soft.   Musculoskeletal:         General: Normal range of motion.      Cervical back: Normal range of motion and neck supple.      Comments: He is wearing a hard collar.  I am unable to elicit any pain throughout his upper or lower extremities specifically his pelvis or spine or lower extremities.   Skin:     General: Skin is warm and dry.   Neurological:      Mental Status: He is alert.      Comments: No meaningful conversation.  He does report having pain to his neck.  He is awake and alert.   Psychiatric:      Comments: Seems consistent with his previous history of traumatic brain injury         Procedures           ED Course  ED Course as of 03/15/25 1723   Sat Mar 15, 2025   1435 I attempted to contact the caregivers without any answer.  EMS reports the patient fell and was on the ground around an hour.  He has a history of a TBI.  I am unable to elicit any pain anywhere except he does have some pain in the cervical spine was also wearing a neck collar.  Does have a small nonsuturable laceration to his corner of his left lip.  Differential includes fracture.  Head injury.  Unable to elicit any pain throughout his other extremities or pelvis.  Given his history of traumatic brain injury exam is difficult. [JM]   1554 CT chest interpreted by myself showing evidence of pneumonia versus atelectasis.  CT head cervical spine and abdomen pelvis no acute process interpreted by myself. [JM]   1600 CT abdomen pelvis interpreted by myself showing enlarged bladder [JM]   1602 EKG interpreted by myself with no acute process [JM]   1709 Awaiting urinalysis [JM]   1719 I had a great conversation with the program  director where the patient lives at his group home named [facility].  She tells me that he was on the ground for split-second and typically can [place] to assist to a wheelchair.  I did advise her of the findings of the lab work.  He is mentating pretty well at this point given his history.  He is able to tell me that he is not hurting is ready to go home he has no real other complaints.  I did discuss with Anderson about the findings of the CAT scan of the chest.  She does tell me that he had the flu or viral illness around 2 weeks ago.  I do not think he clinically has any pneumonia.  I do not think there is any clear benefit to placing him on antibiotics at this point.  I did advise tear the signs symptoms worsen condition.  I think he is okay to go home.  No clear benefit to admission at this point I do not see any musculoskeletal trauma.    Pt thankful and agreeable with tx poc.  We discussed the signs and symptoms of worsening condition as well as what should bring them back to the emergency department along with appropriate follow-up.  Well aware of the ss of worsening condition.     If advanced imaging was ordered, please see the radiology interpretation of the CT scan MRI or ultrasound for the official reading.   [JM]   1723 Care advised the patient recently had influenza or viral illness around 1 to 2 weeks ago.  He is not hypoxic.  There is no leukocytosis.  He is not requiring any oxygenation.  He is not tachycardic either.  I do not think he clinically has pneumonia. [JM]      ED Course User Index  [JM] Nabil Barrientos APRN                                           CT Head Without Contrast   Final Result   Impression:      1. No evidence of intracranial injury   2. No evidence of cervical spine fracture            Electronically Signed: Rehan Caro     3/15/2025 3:35 PM EDT     Workstation ID: [id number]      CT Cervical Spine Without Contrast   Final Result   Impression:      1. No evidence of intracranial injury    2. No evidence of cervical spine fracture            Electronically Signed: Rehan Caro     3/15/2025 3:35 PM EDT     Workstation ID: [id number]      CT Chest Without Contrast Diagnostic   Final Result   Impression:   Mild groundglass opacities in both lungs, which may represent atelectasis, sequelae of prior infection, or mild pneumonia. Additional milder right upper lobe groundglass opacity which may also be infectious/inflammatory.      Otherwise, no acute findings in the chest, abdomen, or pelvis.         Electronically Signed: Pillo Rinaldi     3/15/2025 3:45 PM EDT     Workstation ID: [id number]      CT Abdomen Pelvis Without Contrast   Final Result   Impression:   Mild groundglass opacities in both lungs, which may represent atelectasis, sequelae of prior infection, or mild pneumonia. Additional milder right upper lobe groundglass opacity which may also be infectious/inflammatory.      Otherwise, no acute findings in the chest, abdomen, or pelvis.         Electronically Signed: Pillo Rinaldi     3/15/2025 3:45 PM EDT     Workstation ID: [id number]                    Medical Decision Making  Problems Addressed:  Acute head injury, initial encounter: complicated acute illness or injury  History of seizures: complicated acute illness or injury  History of traumatic brain injury: complicated acute illness or injury  Lip laceration, initial encounter: complicated acute illness or injury  Referred by health care professional: complicated acute illness or injury  Strain of neck muscle, initial encounter: complicated acute illness or injury    Amount and/or Complexity of Data Reviewed  External Data Reviewed: labs, radiology and ECG.  Labs: ordered. Decision-making details documented in ED Course.  Radiology: ordered. Decision-making details documented in ED Course.  ECG/medicine tests: ordered.    Risk  Prescription drug management.        Final diagnoses:   Acute head injury, initial encounter   Lip laceration,  initial encounter   History of traumatic brain injury   History of seizures   Referred by health care professional   Strain of neck muscle, initial encounter       ED Disposition  ED Disposition       ED Disposition   Discharge    Condition   Stable    Comment   --               Yelena Fuentes MD  [address] [facility] [address] [phone]    Schedule an appointment as soon as possible for a visit       [facility] EMERGENCY DEPARTMENT  [address] [address] [phone]    If symptoms worsen         Medication List      No changes were made to your prescriptions during this visit.            Nabil Barrientos, APRN  03/15/25 1724

## 2025-03-18 LAB
QT INTERVAL: 388 MS
QTC INTERVAL: 415 MS